# Patient Record
Sex: MALE | Race: WHITE | NOT HISPANIC OR LATINO | Employment: OTHER | URBAN - NONMETROPOLITAN AREA
[De-identification: names, ages, dates, MRNs, and addresses within clinical notes are randomized per-mention and may not be internally consistent; named-entity substitution may affect disease eponyms.]

---

## 2017-01-19 ENCOUNTER — TRANSCRIBE ORDERS (OUTPATIENT)
Dept: ADMINISTRATIVE | Facility: HOSPITAL | Age: 57
End: 2017-01-19

## 2017-01-19 ENCOUNTER — HOSPITAL ENCOUNTER (OUTPATIENT)
Dept: CT IMAGING | Facility: HOSPITAL | Age: 57
Discharge: HOME | End: 2017-01-19
Attending: INTERNAL MEDICINE | Admitting: INTERNAL MEDICINE

## 2017-01-19 ENCOUNTER — APPOINTMENT (OUTPATIENT)
Dept: LAB | Facility: HOSPITAL | Age: 57
End: 2017-01-19
Attending: INTERNAL MEDICINE

## 2017-01-19 DIAGNOSIS — C34.11 SQUAMOUS CELL CARCINOMA OF BRONCHUS IN RIGHT UPPER LOBE (HCC): Primary | ICD-10-CM

## 2017-01-19 DIAGNOSIS — C34.00 LUNG CANCER, MAIN BRONCHUS, UNSPECIFIED LATERALITY (HCC): ICD-10-CM

## 2017-01-19 LAB
ALBUMIN SERPL-MCNC: 4.5 G/DL (ref 3.5–5)
ALBUMIN/GLOB SERPL: 1.5 G/DL (ref 1.1–2.5)
ALP SERPL-CCNC: 84 U/L (ref 24–120)
ALT SERPL W P-5'-P-CCNC: 47 U/L (ref 0–54)
ANION GAP SERPL CALCULATED.3IONS-SCNC: 15 MMOL/L (ref 4–13)
AST SERPL-CCNC: 31 U/L (ref 7–45)
BASOPHILS # BLD AUTO: 0.03 10*3/MM3 (ref 0–0.2)
BASOPHILS NFR BLD AUTO: 0.4 % (ref 0–2)
BILIRUB SERPL-MCNC: 0.6 MG/DL (ref 0.1–1)
BUN BLD-MCNC: 17 MG/DL (ref 5–21)
BUN/CREAT SERPL: 20.2 (ref 7–25)
CALCIUM SPEC-SCNC: 9.4 MG/DL (ref 8.4–10.4)
CEA SERPL-MCNC: 1.55 NG/ML (ref 0–5)
CHLORIDE SERPL-SCNC: 97 MMOL/L (ref 98–110)
CO2 SERPL-SCNC: 24 MMOL/L (ref 24–31)
CREAT BLD-MCNC: 0.84 MG/DL (ref 0.5–1.4)
CREAT BLDA-MCNC: 0.9 MG/DL (ref 0.6–1.3)
DEPRECATED RDW RBC AUTO: 40.5 FL (ref 40–54)
EOSINOPHIL # BLD AUTO: 0.2 10*3/MM3 (ref 0–0.7)
EOSINOPHIL NFR BLD AUTO: 2.7 % (ref 0–4)
ERYTHROCYTE [DISTWIDTH] IN BLOOD BY AUTOMATED COUNT: 13.2 % (ref 12–15)
GFR SERPL CREATININE-BSD FRML MDRD: 94 ML/MIN/1.73
GLOBULIN UR ELPH-MCNC: 3.1 GM/DL
GLUCOSE BLD-MCNC: 93 MG/DL (ref 70–100)
HCT VFR BLD AUTO: 44.7 % (ref 40–52)
HGB BLD-MCNC: 15.2 G/DL (ref 14–18)
IMM GRANULOCYTES # BLD: 0 10*3/MM3 (ref 0–0.03)
IMM GRANULOCYTES NFR BLD: 0 % (ref 0–5)
LYMPHOCYTES # BLD AUTO: 1.16 10*3/MM3 (ref 0.72–4.86)
LYMPHOCYTES NFR BLD AUTO: 15.9 % (ref 15–45)
MCH RBC QN AUTO: 29.6 PG (ref 28–32)
MCHC RBC AUTO-ENTMCNC: 34 G/DL (ref 33–36)
MCV RBC AUTO: 87.1 FL (ref 82–95)
MONOCYTES # BLD AUTO: 0.6 10*3/MM3 (ref 0.19–1.3)
MONOCYTES NFR BLD AUTO: 8.2 % (ref 4–12)
NEUTROPHILS # BLD AUTO: 5.29 10*3/MM3 (ref 1.87–8.4)
NEUTROPHILS NFR BLD AUTO: 72.8 % (ref 39–78)
PLATELET # BLD AUTO: 178 10*3/MM3 (ref 130–400)
PMV BLD AUTO: 10.4 FL (ref 6–12)
POTASSIUM BLD-SCNC: 3.9 MMOL/L (ref 3.5–5.3)
PROT SERPL-MCNC: 7.6 G/DL (ref 6.3–8.7)
RBC # BLD AUTO: 5.13 10*6/MM3 (ref 4.8–5.9)
SODIUM BLD-SCNC: 136 MMOL/L (ref 135–145)
WBC NRBC COR # BLD: 7.28 10*3/MM3 (ref 4.8–10.8)

## 2017-01-19 PROCEDURE — 36415 COLL VENOUS BLD VENIPUNCTURE: CPT | Performed by: INTERNAL MEDICINE

## 2017-01-19 PROCEDURE — 71260 CT THORAX DX C+: CPT

## 2017-01-19 PROCEDURE — 82378 CARCINOEMBRYONIC ANTIGEN: CPT | Performed by: INTERNAL MEDICINE

## 2017-01-19 PROCEDURE — 0 IOPAMIDOL 61 % SOLUTION: Performed by: INTERNAL MEDICINE

## 2017-01-19 PROCEDURE — 82565 ASSAY OF CREATININE: CPT

## 2017-01-19 PROCEDURE — 80053 COMPREHEN METABOLIC PANEL: CPT | Performed by: INTERNAL MEDICINE

## 2017-01-19 PROCEDURE — 85025 COMPLETE CBC W/AUTO DIFF WBC: CPT | Performed by: INTERNAL MEDICINE

## 2017-01-19 RX ADMIN — IOPAMIDOL 100 ML: 612 INJECTION, SOLUTION INTRAVENOUS at 10:30

## 2017-01-26 ENCOUNTER — TRANSCRIBE ORDERS (OUTPATIENT)
Dept: INTERNAL MEDICINE | Facility: CLINIC | Age: 57
End: 2017-01-26

## 2017-01-26 ENCOUNTER — INFUSION (OUTPATIENT)
Dept: ONCOLOGY | Facility: HOSPITAL | Age: 57
End: 2017-01-26

## 2017-01-26 ENCOUNTER — HOSPITAL ENCOUNTER (OUTPATIENT)
Dept: INTERVENTIONAL RADIOLOGY/VASCULAR | Facility: HOSPITAL | Age: 57
Discharge: HOME | End: 2017-01-26
Attending: INTERNAL MEDICINE | Admitting: INTERNAL MEDICINE

## 2017-01-26 VITALS
SYSTOLIC BLOOD PRESSURE: 134 MMHG | TEMPERATURE: 97.3 F | RESPIRATION RATE: 20 BRPM | DIASTOLIC BLOOD PRESSURE: 78 MMHG | OXYGEN SATURATION: 96 % | HEART RATE: 73 BPM

## 2017-01-26 DIAGNOSIS — T82.898A OTHER COMPLICATIONS DUE TO OTHER VASCULAR DEVICE, IMPLANT, AND GRAFT: ICD-10-CM

## 2017-01-26 DIAGNOSIS — Z95.828 PORT CATHETER IN PLACE: Primary | ICD-10-CM

## 2017-01-26 DIAGNOSIS — C34.11 MALIGNANT NEOPLASM OF UPPER LOBE BRONCHUS, RIGHT (HCC): ICD-10-CM

## 2017-01-26 DIAGNOSIS — C34.11 MALIGNANT NEOPLASM OF UPPER LOBE BRONCHUS, RIGHT (HCC): Primary | ICD-10-CM

## 2017-01-26 DIAGNOSIS — C34.11 MALIGNANT NEOPLASM OF UPPER LOBE OF RIGHT LUNG (HCC): Primary | ICD-10-CM

## 2017-01-26 DIAGNOSIS — Z95.828 PORT CATHETER IN PLACE: ICD-10-CM

## 2017-01-26 PROCEDURE — 0 IOPAMIDOL 61 % SOLUTION: Performed by: RADIOLOGY

## 2017-01-26 PROCEDURE — 25010000002 HEPARIN FLUSH (PORCINE) 100 UNIT/ML SOLUTION: Performed by: INTERNAL MEDICINE

## 2017-01-26 PROCEDURE — 36598 INJ W/FLUOR EVAL CV DEVICE: CPT

## 2017-01-26 PROCEDURE — 96523 IRRIG DRUG DELIVERY DEVICE: CPT

## 2017-01-26 RX ORDER — SODIUM CHLORIDE 0.9 % (FLUSH) 0.9 %
10 SYRINGE (ML) INJECTION AS NEEDED
Status: DISCONTINUED | OUTPATIENT
Start: 2017-01-26 | End: 2017-01-27 | Stop reason: HOSPADM

## 2017-01-26 RX ORDER — SODIUM CHLORIDE 0.9 % (FLUSH) 0.9 %
10 SYRINGE (ML) INJECTION AS NEEDED
Start: 2017-01-26

## 2017-01-26 RX ORDER — SODIUM CHLORIDE 0.9 % (FLUSH) 0.9 %
10 SYRINGE (ML) INJECTION AS NEEDED
Status: CANCELLED
Start: 2017-01-26

## 2017-01-26 RX ORDER — SODIUM CHLORIDE 0.9 % (FLUSH) 0.9 %
10 SYRINGE (ML) INJECTION AS NEEDED
Status: DISCONTINUED | OUTPATIENT
Start: 2017-01-26 | End: 2017-01-26 | Stop reason: HOSPADM

## 2017-01-26 RX ADMIN — Medication 10 ML: at 13:04

## 2017-01-26 RX ADMIN — Medication 10 ML: at 08:47

## 2017-01-26 RX ADMIN — IOPAMIDOL 9 ML: 612 INJECTION, SOLUTION INTRAVENOUS at 12:48

## 2017-01-26 RX ADMIN — SODIUM CHLORIDE, PRESERVATIVE FREE 500 UNITS: 5 INJECTION INTRAVENOUS at 08:48

## 2017-01-26 RX ADMIN — SODIUM CHLORIDE, PRESERVATIVE FREE 500 UNITS: 5 INJECTION INTRAVENOUS at 13:05

## 2017-01-26 RX ADMIN — SODIUM CHLORIDE, PRESERVATIVE FREE 500 UNITS: 5 INJECTION INTRAVENOUS at 08:49

## 2017-01-26 NOTE — NURSING NOTE
Pt arrived for port study. States port will flush but not draw blood. Identity confirmed by two identifiers, consent obtained. Assisted onto table. Dual lumen port right clavicular area, lateral port accessed per policy. Flushes easily but will not draw blood. Port study performed, reviewed by MD.

## 2017-01-27 ENCOUNTER — HOSPITAL ENCOUNTER (OUTPATIENT)
Dept: HOSPITAL 58 - LAB | Age: 57
Discharge: HOME | End: 2017-01-27
Attending: FAMILY MEDICINE

## 2017-01-27 VITALS — BODY MASS INDEX: 29 KG/M2

## 2017-01-27 DIAGNOSIS — F32.9: ICD-10-CM

## 2017-01-27 DIAGNOSIS — I10: Primary | ICD-10-CM

## 2017-01-27 DIAGNOSIS — Z85.118: ICD-10-CM

## 2017-01-27 DIAGNOSIS — Z79.899: ICD-10-CM

## 2017-01-27 LAB
ADD URINE MICROSCOPIC: YES
ALBUMIN SERPL-MCNC: 4.5 G/DL (ref 3.4–5)
ALBUMIN/GLOB SERPL: 1.36 {RATIO}
ALP SERPL-CCNC: 74 U/L (ref 50–136)
ALT SERPL-CCNC: 45 U/L (ref 12–78)
ANION GAP SERPL CALC-SCNC: 15.1 MMOL/L
AST SERPL-CCNC: 25 U/L (ref 15–37)
BASOPHILS # BLD AUTO: 0 K/UL (ref 0–0.2)
BASOPHILS NFR BLD AUTO: 0.7 % (ref 0–3)
BILIRUB SERPL-MCNC: 0.53 MG/DL (ref 0–1.2)
BUN SERPL-MCNC: 18 MG/DL (ref 7–18)
BUN/CREAT SERPL: 20.22
CALCIUM SERPL-MCNC: 9.8 MG/DL (ref 8.2–10.2)
CHLORIDE SERPL-SCNC: 105 MMOL/L (ref 98–107)
CO2 BLD-SCNC: 23 MMOL/L (ref 21–32)
CREAT SERPL-MCNC: 0.89 MG/DL (ref 0.6–1.1)
EOSINOPHIL # BLD AUTO: 0.2 K/UL (ref 0–0.7)
EOSINOPHIL NFR BLD AUTO: 3.9 % (ref 0–7)
GFR SERPLBLD BASED ON 1.73 SQ M-ARVRAT: 88 ML/MIN
GLOBULIN SER CALC-MCNC: 3.3 G/L
GLUCOSE SERPL-MCNC: 117 MG/DL (ref 70–100)
HCT VFR BLD AUTO: 44.8 % (ref 42–52)
HGB BLD-MCNC: 15.4 G/DL (ref 14–18)
IMM GRANULOCYTES NFR BLD AUTO: 0.4 % (ref 0–5)
LYMPHOCYTES # SPEC AUTO: 0.9 K/UL (ref 0.6–3.4)
LYMPHOCYTES NFR BLD AUTO: 16.5 % (ref 10–50)
MCH RBC QN: 30.6 PG (ref 27–31)
MCHC RBC AUTO-ENTMCNC: 34.4 G/DL (ref 31.8–35.4)
MCV RBC: 88.9 FL (ref 80–94)
MONOCYTES # BLD AUTO: 0.5 K/UL (ref 0.4–2)
MONOCYTES NFR BLD AUTO: 8.5 % (ref 0–10)
NEUTROPHILS # BLD AUTO: 4 K/UL (ref 2–6.9)
NEUTROPHILS NFR BLD AUTO: 70 %
PH UR: 5.5 [PH] (ref 5–9)
PLATELET # BLD AUTO: 171 10^3/UL (ref 140–440)
POTASSIUM SERPL-SCNC: 4.1 MMOL/L (ref 3.5–5.1)
PROT SERPL-MCNC: 7.8 G/DL (ref 6.4–8.2)
RBC # BLD AUTO: 5.04 10^6/UL (ref 4.7–6.1)
SODIUM SERPL-SCNC: 139 MMOL/L (ref 136–145)
SP GR UR: 1.02 (ref 1–1.03)
WBC # BLD AUTO: 5.68 K/UL (ref 4.2–10.2)

## 2017-01-27 PROCEDURE — 80053 COMPREHEN METABOLIC PANEL: CPT

## 2017-01-27 PROCEDURE — 84443 ASSAY THYROID STIM HORMONE: CPT

## 2017-01-27 PROCEDURE — 85025 COMPLETE CBC W/AUTO DIFF WBC: CPT

## 2017-01-27 PROCEDURE — 84439 ASSAY OF FREE THYROXINE: CPT

## 2017-01-27 PROCEDURE — 81001 URINALYSIS AUTO W/SCOPE: CPT

## 2017-01-27 PROCEDURE — 36415 COLL VENOUS BLD VENIPUNCTURE: CPT

## 2017-01-31 ENCOUNTER — OFFICE VISIT (OUTPATIENT)
Dept: SURGERY | Age: 57
End: 2017-01-31

## 2017-01-31 ENCOUNTER — OFFICE VISIT (OUTPATIENT)
Dept: GASTROENTEROLOGY | Facility: CLINIC | Age: 57
End: 2017-01-31

## 2017-01-31 VITALS
WEIGHT: 181 LBS | BODY MASS INDEX: 27.52 KG/M2 | SYSTOLIC BLOOD PRESSURE: 100 MMHG | HEART RATE: 88 BPM | DIASTOLIC BLOOD PRESSURE: 70 MMHG

## 2017-01-31 VITALS
DIASTOLIC BLOOD PRESSURE: 84 MMHG | OXYGEN SATURATION: 98 % | HEIGHT: 66 IN | HEART RATE: 60 BPM | WEIGHT: 181 LBS | BODY MASS INDEX: 29.09 KG/M2 | SYSTOLIC BLOOD PRESSURE: 122 MMHG

## 2017-01-31 DIAGNOSIS — Z01.818 PRE-OP EVALUATION: ICD-10-CM

## 2017-01-31 DIAGNOSIS — M54.2 NECK PAIN ON RIGHT SIDE: Primary | ICD-10-CM

## 2017-01-31 DIAGNOSIS — I10 HTN (HYPERTENSION), BENIGN: ICD-10-CM

## 2017-01-31 DIAGNOSIS — K62.5 BRBPR (BRIGHT RED BLOOD PER RECTUM): ICD-10-CM

## 2017-01-31 DIAGNOSIS — T14.8XXA SKIN EXCORIATION: ICD-10-CM

## 2017-01-31 DIAGNOSIS — R19.4 CHANGE IN BOWEL HABITS: Primary | ICD-10-CM

## 2017-01-31 PROCEDURE — 99204 OFFICE O/P NEW MOD 45 MIN: CPT | Performed by: CLINICAL NURSE SPECIALIST

## 2017-01-31 PROCEDURE — 99213 OFFICE O/P EST LOW 20 MIN: CPT | Performed by: SURGERY

## 2017-01-31 RX ORDER — FLUOXETINE 10 MG/1
10 CAPSULE ORAL DAILY
COMMUNITY

## 2017-01-31 RX ORDER — LISINOPRIL 5 MG/1
5 TABLET ORAL DAILY
COMMUNITY

## 2017-01-31 RX ORDER — BUDESONIDE AND FORMOTEROL FUMARATE DIHYDRATE 80; 4.5 UG/1; UG/1
2 AEROSOL RESPIRATORY (INHALATION) 2 TIMES DAILY
COMMUNITY

## 2017-01-31 RX ORDER — MELOXICAM 15 MG/1
15 TABLET ORAL DAILY
COMMUNITY

## 2017-01-31 NOTE — PROGRESS NOTES
"Chief Complaint   Patient presents with   • Rectal Bleeding     New patient ref by Dr. Schneider     Subjective   HPI  Familia Michael is a 56 y.o. male who presents with a complaint of rectal bleeding. He has had some change in bowels with associated BRBPR. Bowels are more constipated which began with the beginning of chemo in 2014. He will go 2 days without a BM and they are hard and difficult to evacuate. No triggers. No alleviating factors.  First episode of BRBPR was last week. None the last few days. It was of small to moderate amount on the tissue on the tissue when he wipes. He does have rectal itching and burning. He is unsure if he has hemorrhoids. He is being followed by Dr schneider for Stage III squamous cell carcinoma of the lung. He has completed his chemo and radiation and is in remission. He has never had a colonoscopy. No known family hx of colon cancer.       Past Medical History   Diagnosis Date   • Arthritis    • Asthma    • Cancer      lung   • COPD (chronic obstructive pulmonary disease)    • Hypertension      Past Surgical History   Procedure Laterality Date   • Finger surgery       Reattachment of left ring finger   • Bronchoscopy     • Mediport insertion, single       Outpatient Prescriptions Marked as Taking for the 1/31/17 encounter (Office Visit) with DOLORES Mcmahon   Medication Sig Dispense Refill   • albuterol (PROVENTIL HFA;VENTOLIN HFA) 108 (90 BASE) MCG/ACT inhaler Every 6 (Six) Hours.     • budesonide-formoterol (SYMBICORT) 160-4.5 MCG/ACT inhaler Inhale 2 puffs 2 (Two) Times a Day.     • FLUoxetine (PROzac) 10 MG capsule Take 10 mg by mouth Daily.     • lisinopril (PRINIVIL,ZESTRIL) 10 MG tablet Take 10 mg by mouth daily.       Allergies   Allergen Reactions   • Tylenol With Codeine #3  [Acetaminophen-Codeine] Hives, Itching and Rash   • Naproxen      \"hurts my stomach\"   • Sulfa Antibiotics Rash     Social History     Social History   • Marital status: Single     " Spouse name: N/A   • Number of children: N/A   • Years of education: N/A     Occupational History   • Not on file.     Social History Main Topics   • Smoking status: Former Smoker     Packs/day: 2.00     Years: 20.00     Types: Cigarettes   • Smokeless tobacco: Never Used   • Alcohol use No   • Drug use: No   • Sexual activity: Defer     Other Topics Concern   • Not on file     Social History Narrative     Family History   Problem Relation Age of Onset   • Colon cancer Neg Hx    • Colon polyps Neg Hx      Health Maintenance   Topic Date Due   • HEPATITIS C SCREENING  1960   • TDAP/TD VACCINES (1 - Tdap) 12/22/1978   • INFLUENZA VACCINE  08/01/2016     Review of Systems   Constitutional: Negative for activity change, appetite change, chills, diaphoresis, fatigue, fever and unexpected weight change.   HENT: Negative for ear pain, hearing loss, mouth sores, sore throat, trouble swallowing and voice change.    Eyes: Negative.    Respiratory: Negative for cough, choking, shortness of breath and wheezing.    Cardiovascular: Negative for chest pain and palpitations.   Gastrointestinal: Positive for anal bleeding and constipation. Negative for abdominal pain, blood in stool, diarrhea, nausea and vomiting.   Endocrine: Negative for cold intolerance and heat intolerance.   Genitourinary: Negative for decreased urine volume, dysuria, frequency, hematuria and urgency.   Musculoskeletal: Negative for back pain, gait problem and myalgias.   Skin: Negative for color change, pallor and rash.   Allergic/Immunologic: Negative for food allergies and immunocompromised state.   Neurological: Negative for dizziness, tremors, seizures, syncope, weakness, light-headedness, numbness and headaches.   Hematological: Negative for adenopathy. Does not bruise/bleed easily.   Psychiatric/Behavioral: Negative for agitation and confusion. The patient is not nervous/anxious.    All other systems reviewed and are negative.    Objective  "  Vitals:    01/31/17 1002   BP: 122/84   Pulse: 60   SpO2: 98%   Weight: 181 lb (82.1 kg)   Height: 66\" (167.6 cm)     Body mass index is 29.21 kg/(m^2).  Physical Exam   Constitutional: He is oriented to person, place, and time. He appears well-developed and well-nourished.   HENT:   Head: Normocephalic and atraumatic.   Eyes: Pupils are equal, round, and reactive to light.   Neck: Normal range of motion. Neck supple. No tracheal deviation present.   Cardiovascular: Normal rate, regular rhythm and normal heart sounds.  Exam reveals no gallop and no friction rub.    No murmur heard.  Pulmonary/Chest: Effort normal and breath sounds normal. No respiratory distress. He has no wheezes. He has no rales. He exhibits no tenderness.   Abdominal: Soft. Bowel sounds are normal. He exhibits no distension. There is no hepatosplenomegaly. There is no tenderness. There is no rigidity, no rebound and no guarding.   Genitourinary:   Genitourinary Comments: Small hemorrhoid no mass appreciated.    Musculoskeletal: Normal range of motion. He exhibits no edema, tenderness or deformity.   Neurological: He is alert and oriented to person, place, and time. He has normal reflexes.   Skin: Skin is warm and dry. No rash noted. No pallor.   From the top of his intergluteal cleft to his rectum he has skin excoriation and irritation. No ulcer or decubitus noted. No mass or abscess on rectal exam.    Psychiatric: He has a normal mood and affect. His behavior is normal. Judgment and thought content normal.     Assessment/Plan   Familia was seen today for rectal bleeding.    Diagnoses and all orders for this visit:    Change in bowel habits  -     polyethylene glycol (GoLYTELY) 236 G solution; Starting at noon on day prior to procedure, drink 8 ounces every 30 minutes until all gone or stools are clear. May add flavor packet.  -     Case Request; Standing  -     Implement Anesthesia Orders Day of Procedure; Standing  -     Obtain Informed " Consent; Standing  -     Verify Informed Consent; Standing  -     Verify bowel prep was successful; Standing  -     Case Request    BRBPR (bright red blood per rectum)    HTN (hypertension), benign  Comments:  contine BP medication the am of procedure.     Skin excoriation  Comments:  intergluteal cleft to the rectum with redness and skin breakdown    Education provided. He is to start a fiber regimen with increased water, I suggested Miralax up to 17 grams twice per day. I  Made dietary modification suggestions as well. I also suggested that he use A&D as a skin barrier and to avoid toilet paper use sensitive wipes only. Keep skin as dry as possible. Open to air when he can.   COLONOSCOPY WITH ANESTHESIA (N/A)  EMR Dragon/transcription disclaimer: Much of this encounter note is electronic transcription/translation of spoken language to printed text. The electronic translation of spoken language may be erroneous, or at times, nonsensical words or phrases may be inadvertently transcribed. Although I have reviewed the note for such errors, some may still exist.  Body mass index is 29.21 kg/(m^2).  Return in about 2 weeks (around 2/14/2017).

## 2017-01-31 NOTE — MR AVS SNAPSHOT
Familia Michael   1/31/2017 10:30 AM   Office Visit    Dept Phone:  [phone]   Encounter #:  [id number]    Provider:  DOLORES Mcmahon   Department:  [facility] GASTROENTEROLOGY                Your Full Care Plan              Today's Medication Changes          These changes are accurate as of: 1/31/17 11:02 AM.  If you have any questions, ask your nurse or doctor.               New Medication(s)Ordered:     polyethylene glycol 236 G solution   Commonly known as:  GoLYTELY   Starting at noon on day prior to procedure, drink 8 ounces every 30 minutes until all gone or stools are clear. May add flavor packet.   Started by:  DOLORES Mcmahon            Where to Get Your Medications      These medications were sent to [facility] - [place], [place] - [address] [place] [address] - [phone]  - [phone] FX  [address] [place] [id number]     Phone:  [phone]     polyethylene glycol 236 G solution                  Your Updated Medication List          This list is accurate as of: 1/31/17 11:02 AM.  Always use your most recent med list.                albuterol 108 (90 BASE) MCG/ACT inhaler   Commonly known as:  PROVENTIL HFA;VENTOLIN HFA       budesonide-formoterol 160-4.5 MCG/ACT inhaler   Commonly known as:  SYMBICORT       FLUoxetine 10 MG capsule   Commonly known as:  PROzac       lisinopril 10 MG tablet   Commonly known as:  PRINIVIL,ZESTRIL       omeprazole 20 MG capsule   Commonly known as:  priLOSEC       polyethylene glycol 236 G solution   Commonly known as:  GoLYTELY   Starting at noon on day prior to procedure, drink 8 ounces every 30 minutes until all gone or stools are clear. May add flavor packet.       sucralfate 1 G tablet   Commonly known as:  CARAFATE               We Performed the Following     Case Request       You Were Diagnosed With        Codes Comments    Change in bowel habits    -  Primary ICD-10-CM: R19.4  ICD-9-CM:  "787.99     BRBPR (bright red blood per rectum)     ICD-10-CM: K62.5  ICD-9-CM: 569.3     HTN (hypertension), benign     ICD-10-CM: I10  ICD-9-CM: 401.1 contine BP medication the am of procedure.       Instructions     None    Patient Instructions History      Upcoming Appointments     Visit Type Date Time Department    NEW PATIENT 1/31/2017 10:30 AM [facility] GASTRO PAD    OFFICE VISIT 2/15/2017  8:00 AM [facility] GASTRO PAD    PORT FLUSH 3/9/2017  9:00 AM [facility] OP INFU ONC    FOLLOW UP - ONCOLOGY 6/29/2017  1:30 PM NEW RAD ONC PAD    CT PAD CHEST W CONTRAST 7/6/2017  9:30 AM [facility] PAD CT BIC      MyChart Signup     Our records indicate that you have declined [facility] signup. If you would like to sign up for [facility], please email [email or web address] or call [phone] to obtain an activation code.             Other Info from Your Visit           Your Appointments     Feb 15, 2017  8:00 AM CST   Office Visit with DOLORES Mcmahon   [facility] (--)    [address] [place] [address] [address]  [facility] [id number] [phone]           Arrive 15 minutes prior to appointment.            Mar 09, 2017  9:00 AM CST   PORT FLUSH with -3 [facility] PAD OP INFUS   [facility] OP INFU ONC– ONCOLOGY ([place])    [address] [place] [id number] [phone]            Jun 29, 2017  1:30 PM CDT   FOLLOW UP with Moses Bennett III, MD   NEW RAD ONC PAD (--)    [address] [place] [id number] [phone]            Jul 06, 2017  9:30 AM CDT   CT pad chest w contrast with PAD BIC CT 1   [facility] CT BIC ([place])    [address] [place] [id number] [phone]           BRING CURRENT LIST MEDS              Allergies     Tylenol With Codeine #3  [Acetaminophen-codeine]  Hives, Itching, Rash    Naproxen      \"hurts my stomach\"    Sulfa Antibiotics  Rash      Reason for Visit     Rectal Bleeding New patient ref by Dr. Schneider    " "  Vital Signs     Blood Pressure Pulse Height Weight Oxygen Saturation Body Mass Index    122/84 60 66\" (167.6 cm) 181 lb (82.1 kg) 98% 29.21 kg/m2    Smoking Status                   Former Smoker           Problems and Diagnoses Noted     BRBPR (bright red blood per rectum)    Change in bowel habits    HTN (hypertension), benign        "

## 2017-02-01 ENCOUNTER — TELEPHONE (OUTPATIENT)
Dept: SURGERY | Age: 57
End: 2017-02-01

## 2017-02-01 ENCOUNTER — PREP FOR PROCEDURE (OUTPATIENT)
Dept: SURGERY | Age: 57
End: 2017-02-01

## 2017-02-01 DIAGNOSIS — M54.2 NECK PAIN ON RIGHT SIDE: Primary | ICD-10-CM

## 2017-02-02 ENCOUNTER — HOSPITAL ENCOUNTER (OUTPATIENT)
Dept: HOSPITAL 58 - RAD | Age: 57
Discharge: HOME | End: 2017-02-02
Attending: SURGERY

## 2017-02-02 ENCOUNTER — TELEPHONE (OUTPATIENT)
Dept: SURGERY | Age: 57
End: 2017-02-02

## 2017-02-02 VITALS — BODY MASS INDEX: 29 KG/M2

## 2017-02-02 DIAGNOSIS — M54.2: Primary | ICD-10-CM

## 2017-02-02 NOTE — US
EXAM:  Ultrasound right upper extremity venous Doppler. 

  

HISTORY:  Right neck pain. 

  

COMPARISON:  None available. 

  

TECHNIQUE:  Multiple gray scale and color Doppler images. 

  

FINDINGS:  There is normal color flow, compressibility, and augmentation of flow within the right ju
gular, subclavian, axillary, brachial, basilic, cephalic, radial, and ulnar veins.  There is some ec
hogenic material surrounding the subcutaneous portion of the chest port. 

  

------------------------------ 

IMPRESSION: 

1.  No evidence for right upper extremity deep vein thrombosis at the levels examined. 

2.  Possible clot surrounding the proximal chest port.  Consider fluoroscopic evaluation.

## 2017-02-03 ENCOUNTER — HOSPITAL ENCOUNTER (OUTPATIENT)
Dept: PREADMISSION TESTING | Age: 57
Discharge: HOME | End: 2017-02-03
Payer: MEDICAID

## 2017-02-03 VITALS — WEIGHT: 180 LBS | BODY MASS INDEX: 28.93 KG/M2 | HEIGHT: 66 IN

## 2017-02-03 DIAGNOSIS — Z01.818 PRE-OP EVALUATION: ICD-10-CM

## 2017-02-06 ENCOUNTER — HOSPITAL ENCOUNTER (OUTPATIENT)
Age: 57
Setting detail: OUTPATIENT SURGERY
Discharge: HOME | End: 2017-02-06
Attending: SURGERY | Admitting: SURGERY
Payer: MEDICAID

## 2017-02-06 ENCOUNTER — SURGERY (OUTPATIENT)
Age: 57
End: 2017-02-06

## 2017-02-06 ENCOUNTER — ANESTHESIA EVENT (OUTPATIENT)
Dept: OPERATING ROOM | Age: 57
End: 2017-02-06
Payer: MEDICAID

## 2017-02-06 ENCOUNTER — ANESTHESIA (OUTPATIENT)
Dept: OPERATING ROOM | Age: 57
End: 2017-02-06
Payer: MEDICAID

## 2017-02-06 VITALS — SYSTOLIC BLOOD PRESSURE: 98 MMHG | OXYGEN SATURATION: 99 % | DIASTOLIC BLOOD PRESSURE: 64 MMHG

## 2017-02-06 VITALS
OXYGEN SATURATION: 96 % | SYSTOLIC BLOOD PRESSURE: 150 MMHG | TEMPERATURE: 97.5 F | BODY MASS INDEX: 28.93 KG/M2 | DIASTOLIC BLOOD PRESSURE: 96 MMHG | WEIGHT: 180 LBS | RESPIRATION RATE: 18 BRPM | HEART RATE: 65 BPM | HEIGHT: 66 IN

## 2017-02-06 PROCEDURE — 7100000000 HC PACU RECOVERY - FIRST 15 MIN: Performed by: SURGERY

## 2017-02-06 PROCEDURE — 7100000010 HC PHASE II RECOVERY - FIRST 15 MIN: Performed by: SURGERY

## 2017-02-06 PROCEDURE — 3600000012 HC SURGERY LEVEL 2 ADDTL 15MIN: Performed by: SURGERY

## 2017-02-06 PROCEDURE — 2580000003 HC RX 258: Performed by: SURGERY

## 2017-02-06 PROCEDURE — 2720000001 HC MISC SURG SUPPLY STERILE $51-500: Performed by: SURGERY

## 2017-02-06 PROCEDURE — 7100000011 HC PHASE II RECOVERY - ADDTL 15 MIN: Performed by: SURGERY

## 2017-02-06 PROCEDURE — 2500000003 HC RX 250 WO HCPCS: Performed by: SURGERY

## 2017-02-06 PROCEDURE — 3700000001 HC ADD 15 MINUTES (ANESTHESIA): Performed by: SURGERY

## 2017-02-06 PROCEDURE — 6360000002 HC RX W HCPCS: Performed by: NURSE ANESTHETIST, CERTIFIED REGISTERED

## 2017-02-06 PROCEDURE — 3600000002 HC SURGERY LEVEL 2 BASE: Performed by: SURGERY

## 2017-02-06 PROCEDURE — 3700000000 HC ANESTHESIA ATTENDED CARE: Performed by: SURGERY

## 2017-02-06 PROCEDURE — 2720000003 HC MISC SUTURE/STAPLES/RELOADS/ETC: Performed by: SURGERY

## 2017-02-06 PROCEDURE — 36590 REMOVAL TUNNELED CV CATH: CPT | Performed by: SURGERY

## 2017-02-06 RX ORDER — METOCLOPRAMIDE HYDROCHLORIDE 5 MG/ML
10 INJECTION INTRAMUSCULAR; INTRAVENOUS
Status: DISCONTINUED | OUTPATIENT
Start: 2017-02-06 | End: 2017-02-06 | Stop reason: HOSPADM

## 2017-02-06 RX ORDER — FENTANYL CITRATE 50 UG/ML
INJECTION, SOLUTION INTRAMUSCULAR; INTRAVENOUS PRN
Status: DISCONTINUED | OUTPATIENT
Start: 2017-02-06 | End: 2017-02-06 | Stop reason: SDUPTHER

## 2017-02-06 RX ORDER — MORPHINE SULFATE 4 MG/ML
4 INJECTION, SOLUTION INTRAMUSCULAR; INTRAVENOUS EVERY 5 MIN PRN
Status: DISCONTINUED | OUTPATIENT
Start: 2017-02-06 | End: 2017-02-06 | Stop reason: HOSPADM

## 2017-02-06 RX ORDER — DIPHENHYDRAMINE HYDROCHLORIDE 50 MG/ML
12.5 INJECTION INTRAMUSCULAR; INTRAVENOUS
Status: DISCONTINUED | OUTPATIENT
Start: 2017-02-06 | End: 2017-02-06 | Stop reason: HOSPADM

## 2017-02-06 RX ORDER — MORPHINE SULFATE 4 MG/ML
2 INJECTION, SOLUTION INTRAMUSCULAR; INTRAVENOUS EVERY 5 MIN PRN
Status: DISCONTINUED | OUTPATIENT
Start: 2017-02-06 | End: 2017-02-06 | Stop reason: HOSPADM

## 2017-02-06 RX ORDER — PROPOFOL 10 MG/ML
INJECTION, EMULSION INTRAVENOUS PRN
Status: DISCONTINUED | OUTPATIENT
Start: 2017-02-06 | End: 2017-02-06 | Stop reason: SDUPTHER

## 2017-02-06 RX ORDER — HYDROCODONE BITARTRATE AND ACETAMINOPHEN 5; 325 MG/1; MG/1
TABLET ORAL
Qty: 10 TABLET | Refills: 0 | Status: SHIPPED | OUTPATIENT
Start: 2017-02-06 | End: 2017-02-22 | Stop reason: ALTCHOICE

## 2017-02-06 RX ORDER — ONDANSETRON 2 MG/ML
INJECTION INTRAMUSCULAR; INTRAVENOUS PRN
Status: DISCONTINUED | OUTPATIENT
Start: 2017-02-06 | End: 2017-02-06 | Stop reason: SDUPTHER

## 2017-02-06 RX ORDER — HYDRALAZINE HYDROCHLORIDE 20 MG/ML
5 INJECTION INTRAMUSCULAR; INTRAVENOUS EVERY 10 MIN PRN
Status: DISCONTINUED | OUTPATIENT
Start: 2017-02-06 | End: 2017-02-06 | Stop reason: HOSPADM

## 2017-02-06 RX ORDER — CEFAZOLIN SODIUM 1 G/3ML
INJECTION, POWDER, FOR SOLUTION INTRAMUSCULAR; INTRAVENOUS PRN
Status: DISCONTINUED | OUTPATIENT
Start: 2017-02-06 | End: 2017-02-06 | Stop reason: SDUPTHER

## 2017-02-06 RX ORDER — MIDAZOLAM HYDROCHLORIDE 1 MG/ML
INJECTION INTRAMUSCULAR; INTRAVENOUS PRN
Status: DISCONTINUED | OUTPATIENT
Start: 2017-02-06 | End: 2017-02-06 | Stop reason: SDUPTHER

## 2017-02-06 RX ORDER — MEPERIDINE HYDROCHLORIDE 25 MG/ML
12.5 INJECTION INTRAMUSCULAR; INTRAVENOUS; SUBCUTANEOUS EVERY 5 MIN PRN
Status: DISCONTINUED | OUTPATIENT
Start: 2017-02-06 | End: 2017-02-06 | Stop reason: HOSPADM

## 2017-02-06 RX ORDER — HYDROCODONE BITARTRATE AND ACETAMINOPHEN 5; 325 MG/1; MG/1
1 TABLET ORAL EVERY 6 HOURS PRN
Status: DISCONTINUED | OUTPATIENT
Start: 2017-02-06 | End: 2017-02-06 | Stop reason: HOSPADM

## 2017-02-06 RX ORDER — LABETALOL HYDROCHLORIDE 5 MG/ML
5 INJECTION, SOLUTION INTRAVENOUS EVERY 10 MIN PRN
Status: DISCONTINUED | OUTPATIENT
Start: 2017-02-06 | End: 2017-02-06 | Stop reason: HOSPADM

## 2017-02-06 RX ORDER — HYDROCODONE BITARTRATE AND ACETAMINOPHEN 5; 325 MG/1; MG/1
2 TABLET ORAL EVERY 6 HOURS PRN
Status: DISCONTINUED | OUTPATIENT
Start: 2017-02-06 | End: 2017-02-06 | Stop reason: HOSPADM

## 2017-02-06 RX ORDER — SODIUM CHLORIDE, SODIUM LACTATE, POTASSIUM CHLORIDE, CALCIUM CHLORIDE 600; 310; 30; 20 MG/100ML; MG/100ML; MG/100ML; MG/100ML
INJECTION, SOLUTION INTRAVENOUS CONTINUOUS
Status: DISCONTINUED | OUTPATIENT
Start: 2017-02-06 | End: 2017-02-06 | Stop reason: HOSPADM

## 2017-02-06 RX ORDER — ENALAPRILAT 2.5 MG/2ML
1.25 INJECTION INTRAVENOUS
Status: DISCONTINUED | OUTPATIENT
Start: 2017-02-06 | End: 2017-02-06 | Stop reason: HOSPADM

## 2017-02-06 RX ORDER — LIDOCAINE HYDROCHLORIDE 10 MG/ML
1 INJECTION, SOLUTION EPIDURAL; INFILTRATION; INTRACAUDAL; PERINEURAL ONCE
Status: COMPLETED | OUTPATIENT
Start: 2017-02-06 | End: 2017-02-06

## 2017-02-06 RX ORDER — PROMETHAZINE HYDROCHLORIDE 25 MG/ML
6.25 INJECTION, SOLUTION INTRAMUSCULAR; INTRAVENOUS
Status: DISCONTINUED | OUTPATIENT
Start: 2017-02-06 | End: 2017-02-06 | Stop reason: HOSPADM

## 2017-02-06 RX ADMIN — MIDAZOLAM HYDROCHLORIDE 2 MG: 1 INJECTION, SOLUTION INTRAMUSCULAR; INTRAVENOUS at 08:23

## 2017-02-06 RX ADMIN — PROPOFOL 30 MG: 10 INJECTION, EMULSION INTRAVENOUS at 08:37

## 2017-02-06 RX ADMIN — FENTANYL CITRATE 50 MCG: 50 INJECTION INTRAMUSCULAR; INTRAVENOUS at 08:28

## 2017-02-06 RX ADMIN — ONDANSETRON HYDROCHLORIDE 4 MG: 2 INJECTION, SOLUTION INTRAVENOUS at 08:35

## 2017-02-06 RX ADMIN — LIDOCAINE HYDROCHLORIDE 70 ML: 10 INJECTION, SOLUTION EPIDURAL; INFILTRATION; INTRACAUDAL; PERINEURAL at 08:48

## 2017-02-06 RX ADMIN — PROPOFOL 30 MG: 10 INJECTION, EMULSION INTRAVENOUS at 08:33

## 2017-02-06 RX ADMIN — FENTANYL CITRATE 50 MCG: 50 INJECTION INTRAMUSCULAR; INTRAVENOUS at 08:26

## 2017-02-06 RX ADMIN — FENTANYL CITRATE 50 MCG: 50 INJECTION INTRAMUSCULAR; INTRAVENOUS at 08:37

## 2017-02-06 RX ADMIN — PROPOFOL 30 MG: 10 INJECTION, EMULSION INTRAVENOUS at 08:28

## 2017-02-06 RX ADMIN — SODIUM CHLORIDE, POTASSIUM CHLORIDE, SODIUM LACTATE AND CALCIUM CHLORIDE: 600; 310; 30; 20 INJECTION, SOLUTION INTRAVENOUS at 07:15

## 2017-02-06 RX ADMIN — CEFAZOLIN 2000 MG: 1 INJECTION, POWDER, FOR SOLUTION INTRAVENOUS at 08:30

## 2017-02-06 RX ADMIN — LIDOCAINE HYDROCHLORIDE 1 ML: 10 INJECTION, SOLUTION EPIDURAL; INFILTRATION; INTRACAUDAL; PERINEURAL at 07:15

## 2017-02-06 ASSESSMENT — PAIN SCALES - GENERAL
PAINLEVEL_OUTOF10: 0
PAINLEVEL_OUTOF10: 0

## 2017-02-06 ASSESSMENT — PAIN - FUNCTIONAL ASSESSMENT: PAIN_FUNCTIONAL_ASSESSMENT: 0-10

## 2017-02-06 ASSESSMENT — PAIN DESCRIPTION - LOCATION: LOCATION: CHEST

## 2017-02-06 ASSESSMENT — PAIN DESCRIPTION - PAIN TYPE: TYPE: SURGICAL PAIN

## 2017-02-07 LAB
EKG P AXIS: 71 DEGREES
EKG P-R INTERVAL: 188 MS
EKG Q-T INTERVAL: 386 MS
EKG QRS DURATION: 98 MS
EKG QTC CALCULATION (BAZETT): 385 MS
EKG T AXIS: 51 DEGREES

## 2017-02-15 ENCOUNTER — OFFICE VISIT (OUTPATIENT)
Dept: GASTROENTEROLOGY | Facility: CLINIC | Age: 57
End: 2017-02-15

## 2017-02-15 ENCOUNTER — TELEPHONE (OUTPATIENT)
Dept: GASTROENTEROLOGY | Facility: CLINIC | Age: 57
End: 2017-02-15

## 2017-02-15 VITALS
HEART RATE: 88 BPM | DIASTOLIC BLOOD PRESSURE: 80 MMHG | SYSTOLIC BLOOD PRESSURE: 120 MMHG | BODY MASS INDEX: 29.09 KG/M2 | RESPIRATION RATE: 18 BRPM | HEIGHT: 66 IN | WEIGHT: 181 LBS

## 2017-02-15 DIAGNOSIS — R23.8 SKIN BREAKDOWN: Primary | ICD-10-CM

## 2017-02-15 DIAGNOSIS — Z78.9 NONSMOKER: ICD-10-CM

## 2017-02-15 PROCEDURE — 99213 OFFICE O/P EST LOW 20 MIN: CPT | Performed by: CLINICAL NURSE SPECIALIST

## 2017-02-15 NOTE — TELEPHONE ENCOUNTER
Spoke to Dasha at [facility] they have diaper dope which has nystatin, zinc oxide, and hydrocortisone in it.  30gm is $30, 90gm is $65.  They can run his insurance and see if it will cover it.      Spoke to patients sister, she has some tubes of nystatin, silver sulfadiazine, and  bacitracin zinc that she can mix together and use if that is ok.    [phone] (sister)    [phone] (pharmacy)

## 2017-02-15 NOTE — PROGRESS NOTES
Chief Complaint   Patient presents with   • GI Problem     Here to discuss rectal irritation patient states not much better          HPI    Familia Michael is a  56 y.o. male here for a follow up visit for excoriated skin of the intergluteal cleft to the rectum with redness and skin breakdown. He has tried A&D and antibiotic ointment mixed to treat and it continues to be a problem for him. He has a colonoscopy scheduled for BRBPR and change in bowels as well.    I have put him on a fiber regimen and this is helping. His bleeding is some improved as well. No fever chills or sweats. No wt loss. He is being treated currently for squamous cell carcinoma of the lung and he has completed his chemo/radiation.   Past Medical History   Diagnosis Date   • Arthritis    • Asthma    • Cancer      lung   • COPD (chronic obstructive pulmonary disease)    • Hypertension      Past Surgical History   Procedure Laterality Date   • Finger surgery       Reattachment of left ring finger   • Bronchoscopy     • Mediport insertion, single         Outpatient Prescriptions Marked as Taking for the 2/15/17 encounter (Office Visit) with DOLORES Mcmahon   Medication Sig Dispense Refill   • albuterol (PROVENTIL HFA;VENTOLIN HFA) 108 (90 BASE) MCG/ACT inhaler Every 6 (Six) Hours.     • budesonide-formoterol (SYMBICORT) 160-4.5 MCG/ACT inhaler Inhale 2 puffs 2 (Two) Times a Day.     • FLUoxetine (PROzac) 10 MG capsule Take 10 mg by mouth Daily.     • lisinopril (PRINIVIL,ZESTRIL) 10 MG tablet Take 10 mg by mouth daily.     • omeprazole (priLOSEC) 20 MG capsule Take 40 mg by mouth daily.     • polyethylene glycol (GoLYTELY) 236 G solution Starting at noon on day prior to procedure, drink 8 ounces every 30 minutes until all gone or stools are clear. May add flavor packet. 4000 mL 0   • sucralfate (CARAFATE) 1 G tablet Take 1 g by mouth 3 (Three) Times a Day.         Allergies   Allergen Reactions   • Tylenol With Codeine #3   "[Acetaminophen-Codeine] Hives, Itching and Rash   • Naproxen      \"hurts my stomach\"   • Sulfa Antibiotics Rash       Social History     Social History   • Marital status: Single     Spouse name: N/A   • Number of children: N/A   • Years of education: N/A     Occupational History   • Not on file.     Social History Main Topics   • Smoking status: Former Smoker     Packs/day: 2.00     Years: 20.00     Types: Cigarettes   • Smokeless tobacco: Never Used   • Alcohol use No   • Drug use: No   • Sexual activity: Defer     Other Topics Concern   • Not on file     Social History Narrative       Family History   Problem Relation Age of Onset   • Colon cancer Neg Hx    • Colon polyps Neg Hx        Review of Systems   Constitutional: Negative for chills, diaphoresis and fever.   HENT: Negative for congestion.    Respiratory: Negative for cough, choking, chest tightness and shortness of breath.    Cardiovascular: Negative for chest pain and palpitations.   Gastrointestinal: Positive for blood in stool. Negative for abdominal pain, nausea and vomiting.   Endocrine: Negative for cold intolerance and heat intolerance.   Musculoskeletal: Negative for back pain and joint swelling.   Skin: Negative for color change and rash.        Skin is persistently excoriated to the rectum intergluteal cleft    Neurological: Negative for headaches.   Hematological: Negative for adenopathy. Does not bruise/bleed easily.   Psychiatric/Behavioral: Negative for agitation and confusion.       Visit Vitals   • /80   • Pulse 88   • Resp 18   • Ht 66\" (167.6 cm)   • Wt 181 lb (82.1 kg)   • BMI 29.21 kg/m2       Physical Exam   Constitutional: He is oriented to person, place, and time. He appears well-developed and well-nourished.   HENT:   Head: Normocephalic and atraumatic.   Eyes: Pupils are equal, round, and reactive to light.   Neck: Normal range of motion. Neck supple. No tracheal deviation present.   Cardiovascular: Normal rate, regular " rhythm and normal heart sounds.  Exam reveals no gallop and no friction rub.    No murmur heard.  Pulmonary/Chest: Effort normal and breath sounds normal. No respiratory distress. He has no wheezes. He has no rales. He exhibits no tenderness.   Abdominal: Soft. Bowel sounds are normal. He exhibits no distension. There is no hepatosplenomegaly. There is no tenderness. There is no rigidity, no rebound and no guarding.   Genitourinary:   Genitourinary Comments: Intergluteal cleft skin breakdown noted redness with a yeast appearance.    Musculoskeletal: Normal range of motion. He exhibits no edema, tenderness or deformity.   Neurological: He is alert and oriented to person, place, and time. He has normal reflexes.   Skin: Skin is warm and dry. No rash noted. No pallor.   Excoriated inner buttocks   Psychiatric: He has a normal mood and affect. His behavior is normal. Judgment and thought content normal.       ASSESSMENT AND PLAN    Familia was seen today for gi problem.    Diagnoses and all orders for this visit:    Skin breakdown    Nonsmoker    I have called a compound pharmacy to see about Nystatin, cortisone, and Zinc oxide ointment and the cost was too much for him therefore his sister is going to use the nystatin he already has and mix with zinc oxide to see if we can make this better. The cream at  pharmacy was going to be in excess of 60$ for 1 tube. I have suggested a quarter size amount up to three times per day until better. Dr dumont will see soon for colonoscopy as well. If this is not improved he is to notify me and we will refer him to a dermatologist.   Return in about 3 weeks (around 3/8/2017).

## 2017-02-16 PROBLEM — R23.8 SKIN BREAKDOWN: Status: ACTIVE | Noted: 2017-02-16

## 2017-02-16 PROBLEM — Z78.9 NONSMOKER: Status: ACTIVE | Noted: 2017-02-16

## 2017-02-16 NOTE — TELEPHONE ENCOUNTER
She can mix the nystatin with zinc oxide that would be fine. It doesn't have the steroid in it. The nystatin may be the ingredient that works thanks brook

## 2017-02-18 ENCOUNTER — HOSPITAL ENCOUNTER (OUTPATIENT)
Dept: HOSPITAL 58 - LAB | Age: 57
Discharge: HOME | End: 2017-02-18
Attending: FAMILY MEDICINE

## 2017-02-18 VITALS — BODY MASS INDEX: 29 KG/M2

## 2017-02-18 DIAGNOSIS — Z09: Primary | ICD-10-CM

## 2017-02-18 DIAGNOSIS — N39.0: ICD-10-CM

## 2017-02-18 LAB
ADD URINE MICROSCOPIC: NO
PH UR: 7.5 [PH] (ref 5–9)
SP GR UR: 1.02 (ref 1–1.03)

## 2017-02-18 PROCEDURE — 81001 URINALYSIS AUTO W/SCOPE: CPT

## 2017-02-20 ENCOUNTER — OUTSIDE FACILITY SERVICE (OUTPATIENT)
Dept: GASTROENTEROLOGY | Facility: CLINIC | Age: 57
End: 2017-02-20

## 2017-02-20 ENCOUNTER — HOSPITAL ENCOUNTER (OUTPATIENT)
Dept: HOSPITAL 58 - SURG | Age: 57
Discharge: HOME | End: 2017-02-20
Attending: INTERNAL MEDICINE

## 2017-02-20 VITALS — DIASTOLIC BLOOD PRESSURE: 66 MMHG | SYSTOLIC BLOOD PRESSURE: 126 MMHG

## 2017-02-20 VITALS — TEMPERATURE: 97.9 F

## 2017-02-20 VITALS — BODY MASS INDEX: 29 KG/M2

## 2017-02-20 DIAGNOSIS — Z12.11: Primary | ICD-10-CM

## 2017-02-20 PROCEDURE — 00810: CPT

## 2017-02-20 PROCEDURE — 45378 DIAGNOSTIC COLONOSCOPY: CPT | Performed by: INTERNAL MEDICINE

## 2017-02-21 NOTE — OP
PROCEDURE:  COLONOSCOPY TO THE CECUM.



ENDOSCOPIST:  SABAS FUNG M.D. 



INDICATION:  SCREENING.



INSTRUMENT:  [facility].



MEDICATION:  PER ANESTHESIA.



PROCEDURE:  The patient was positioned for colonoscopy.  The digital rectal 
exam was negative.  The colonoscope was inserted through the anus and advanced 
to the cecum. 



The cecum was identified using the ileocecal valve and the appendiceal orifice 
as landmarks.  The scope was slowly withdrawn through an adequately prepped 
colon. 



Careful inspection is made of each colonic segment as the scope is withdrawn in 
a circumferential fashion. Care was taken to inspect the proximal side of the 
ileocecal valve, haustral folds, flexures and rectal valves. 



The exam was normal throughout. Retroflex exam was normal. Withdrawal time 6 
minutes and 7 seconds.  



PLAN:  

1.   Suggest repeat colonoscopy for screening in 10 years, sooner if symptoms 
warrant. 



CC:  DR. ALF MCKEON

## 2017-02-22 ENCOUNTER — OFFICE VISIT (OUTPATIENT)
Dept: SURGERY | Age: 57
End: 2017-02-22

## 2017-02-22 VITALS — DIASTOLIC BLOOD PRESSURE: 78 MMHG | HEART RATE: 68 BPM | SYSTOLIC BLOOD PRESSURE: 136 MMHG

## 2017-02-22 DIAGNOSIS — M54.2 NECK PAIN ON RIGHT SIDE: ICD-10-CM

## 2017-02-22 DIAGNOSIS — M54.2 NECK PAIN ON RIGHT SIDE: Primary | ICD-10-CM

## 2017-02-22 PROCEDURE — 99024 POSTOP FOLLOW-UP VISIT: CPT | Performed by: PHYSICIAN ASSISTANT

## 2017-02-26 ENCOUNTER — TELEPHONE (OUTPATIENT)
Dept: GASTROENTEROLOGY | Facility: CLINIC | Age: 57
End: 2017-02-26

## 2017-03-08 ENCOUNTER — OFFICE VISIT (OUTPATIENT)
Dept: GASTROENTEROLOGY | Facility: CLINIC | Age: 57
End: 2017-03-08

## 2017-03-08 VITALS
WEIGHT: 182 LBS | SYSTOLIC BLOOD PRESSURE: 132 MMHG | HEIGHT: 66 IN | OXYGEN SATURATION: 98 % | DIASTOLIC BLOOD PRESSURE: 80 MMHG | BODY MASS INDEX: 29.25 KG/M2 | HEART RATE: 72 BPM

## 2017-03-08 DIAGNOSIS — Z78.9 NONSMOKER: ICD-10-CM

## 2017-03-08 DIAGNOSIS — R23.8 SKIN BREAKDOWN: Primary | ICD-10-CM

## 2017-03-08 PROCEDURE — 99211 OFF/OP EST MAY X REQ PHY/QHP: CPT | Performed by: CLINICAL NURSE SPECIALIST

## 2017-03-08 NOTE — PROGRESS NOTES
Chief Complaint   Patient presents with   • GI Problem     Here to discuss rectal irritation and recent colonoscopy         HPI    Familia Michael is a  56 y.o. male here for a follow up visit for a follow up visit for excoriated skin of the intergluteal cleft to the rectum with redness and skin breakdown. He has tried A&D and antibiotic ointment mixed to treat and it continues to be a problem for him. He has a colonoscopy scheduled for [facility] and change in bowels as well.   I have put him on a fiber regimen and this is helping. His bleeding is some improved as well. No fever chills or sweats. No wt loss. He is being treated currently for squamous cell carcinoma of the lung and he has completed his chemo/radiation.     Today he tells me that this is much improved with the Nystatin and zinc oxide cream that he has been using. No itching or burning. No pain. He feels much better.     Past Medical History   Diagnosis Date   • Arthritis    • Asthma    • Cancer      lung   • COPD (chronic obstructive pulmonary disease)    • Hypertension      Past Surgical History   Procedure Laterality Date   • Finger surgery       Reattachment of left ring finger   • Bronchoscopy     • Mediport insertion, single     • Colonoscopy  02/20/2017     normal exam repeat 10 years.       Outpatient Prescriptions Marked as Taking for the 3/8/17 encounter (Office Visit) with DOLORES Mcmahon   Medication Sig Dispense Refill   • albuterol (PROVENTIL HFA;VENTOLIN HFA) 108 (90 BASE) MCG/ACT inhaler Every 6 (Six) Hours.     • budesonide-formoterol (SYMBICORT) 160-4.5 MCG/ACT inhaler Inhale 2 puffs 2 (Two) Times a Day.     • FLUoxetine (PROzac) 10 MG capsule Take 10 mg by mouth Daily.     • lisinopril (PRINIVIL,ZESTRIL) 10 MG tablet Take 10 mg by mouth daily.     • omeprazole (priLOSEC) 20 MG capsule Take 40 mg by mouth daily.     • polyethylene glycol (GoLYTELY) 236 G solution Starting at noon on day prior to procedure, drink 8 ounces every  "30 minutes until all gone or stools are clear. May add flavor packet. 4000 mL 0   • sucralfate (CARAFATE) 1 G tablet Take 1 g by mouth 3 (Three) Times a Day.         Allergies   Allergen Reactions   • Tylenol With Codeine #3  [Acetaminophen-Codeine] Hives, Itching and Rash   • Naproxen      \"hurts my stomach\"   • Sulfa Antibiotics Rash       Social History     Social History   • Marital status: Single     Spouse name: N/A   • Number of children: N/A   • Years of education: N/A     Occupational History   • Not on file.     Social History Main Topics   • Smoking status: Former Smoker     Packs/day: 2.00     Years: 20.00     Types: Cigarettes   • Smokeless tobacco: Never Used   • Alcohol use No   • Drug use: No   • Sexual activity: Defer     Other Topics Concern   • Not on file     Social History Narrative       Family History   Problem Relation Age of Onset   • Colon cancer Neg Hx    • Colon polyps Neg Hx        Review of Systems   Constitutional: Negative for activity change, appetite change, chills, diaphoresis, fatigue, fever and unexpected weight change.   HENT: Negative for ear pain, hearing loss, mouth sores, sore throat, trouble swallowing and voice change.    Eyes: Negative.    Respiratory: Negative for cough, choking, shortness of breath and wheezing.    Cardiovascular: Negative for chest pain and palpitations.   Gastrointestinal: Negative for abdominal pain, blood in stool, constipation, diarrhea, nausea and vomiting.   Endocrine: Negative for cold intolerance and heat intolerance.   Genitourinary: Negative for decreased urine volume, dysuria, frequency, hematuria and urgency.   Musculoskeletal: Negative for back pain, gait problem and myalgias.   Skin: Negative for color change, pallor and rash.        Small area of skin break down intergluteal cleft top. improved   Allergic/Immunologic: Negative for food allergies and immunocompromised state.   Neurological: Negative for dizziness, tremors, seizures, " "syncope, weakness, light-headedness, numbness and headaches.   Hematological: Negative for adenopathy. Does not bruise/bleed easily.   Psychiatric/Behavioral: Negative for agitation and confusion. The patient is not nervous/anxious.    All other systems reviewed and are negative.      Visit Vitals   • /80   • Pulse 72   • Ht 66\" (167.6 cm)   • Wt 182 lb (82.6 kg)   • SpO2 98%   • BMI 29.38 kg/m2     Body mass index is 29.38 kg/(m^2).    Physical Exam   Constitutional: He is oriented to person, place, and time. He appears well-developed and well-nourished.   HENT:   Head: Normocephalic and atraumatic.   Eyes: Pupils are equal, round, and reactive to light.   Neck: Normal range of motion. Neck supple. No tracheal deviation present.   Cardiovascular: Normal rate, regular rhythm and normal heart sounds.  Exam reveals no gallop and no friction rub.    No murmur heard.  Pulmonary/Chest: Effort normal and breath sounds normal. No respiratory distress. He has no wheezes. He has no rales. He exhibits no tenderness.   Abdominal: Soft. Bowel sounds are normal. He exhibits no distension. There is no hepatosplenomegaly. There is no tenderness. There is no rigidity, no rebound and no guarding.   Genitourinary:   Genitourinary Comments: Small area of continued skin breakdown top 1 inch intergluteal cleft much improved.   Musculoskeletal: Normal range of motion. He exhibits no edema, tenderness or deformity.   Neurological: He is alert and oriented to person, place, and time. He has normal reflexes.   Skin: Skin is warm and dry. No rash noted. No pallor.   Psychiatric: He has a normal mood and affect. His behavior is normal. Judgment and thought content normal.       ASSESSMENT AND PLAN    Familia was seen today for gi problem.    Diagnoses and all orders for this visit:    Skin breakdown  Comments:  improved continue current treatment and continued follow up with PCP to call as needed..     Nonsmoker      He is aware to call " with any further problems. He is much improved and is aware to call if needed.   Return if symptoms worsen or fail to improve.

## 2017-03-09 ENCOUNTER — APPOINTMENT (OUTPATIENT)
Dept: ONCOLOGY | Facility: HOSPITAL | Age: 57
End: 2017-03-09

## 2017-04-26 ENCOUNTER — HOSPITAL ENCOUNTER (OUTPATIENT)
Dept: HOSPITAL 58 - LAB | Age: 57
Discharge: HOME | End: 2017-04-26
Attending: FAMILY MEDICINE

## 2017-04-26 VITALS — BODY MASS INDEX: 29 KG/M2

## 2017-04-26 DIAGNOSIS — F32.9: ICD-10-CM

## 2017-04-26 DIAGNOSIS — I10: Primary | ICD-10-CM

## 2017-04-26 DIAGNOSIS — Z79.899: ICD-10-CM

## 2017-04-26 DIAGNOSIS — C34.90: ICD-10-CM

## 2017-04-26 LAB
ADD URINE MICROSCOPIC: YES
ALBUMIN SERPL-MCNC: 3.9 G/DL (ref 3.4–5)
ALBUMIN/GLOB SERPL: 1.3 {RATIO}
ALP SERPL-CCNC: 66 U/L (ref 50–136)
ALT SERPL-CCNC: 30 U/L (ref 12–78)
ANION GAP SERPL CALC-SCNC: 12 MMOL/L
AST SERPL-CCNC: 16 U/L (ref 15–37)
BASOPHILS # BLD AUTO: 0 K/UL (ref 0–0.2)
BASOPHILS NFR BLD AUTO: 0.7 % (ref 0–3)
BILIRUB SERPL-MCNC: 0.39 MG/DL (ref 0–1.2)
BUN SERPL-MCNC: 22 MG/DL (ref 7–18)
BUN/CREAT SERPL: 25.88
CALCIUM SERPL-MCNC: 9 MG/DL (ref 8.2–10.2)
CHLORIDE SERPL-SCNC: 106 MMOL/L (ref 98–107)
CO2 BLD-SCNC: 25 MMOL/L (ref 21–32)
CREAT SERPL-MCNC: 0.85 MG/DL (ref 0.6–1.1)
EOSINOPHIL # BLD AUTO: 0.2 K/UL (ref 0–0.7)
EOSINOPHIL NFR BLD AUTO: 3 % (ref 0–7)
GFR SERPLBLD BASED ON 1.73 SQ M-ARVRAT: 93 ML/MIN
GLOBULIN SER CALC-MCNC: 3 G/L
GLUCOSE SERPL-MCNC: 100 MG/DL (ref 70–100)
HCT VFR BLD AUTO: 43.3 % (ref 42–52)
HGB BLD-MCNC: 15.2 G/DL (ref 14–18)
IMM GRANULOCYTES NFR BLD AUTO: 0.4 % (ref 0–5)
LYMPHOCYTES # SPEC AUTO: 1 K/UL (ref 0.6–3.4)
LYMPHOCYTES NFR BLD AUTO: 19.1 % (ref 10–50)
MCH RBC QN: 30.6 PG (ref 27–31)
MCHC RBC AUTO-ENTMCNC: 35.1 G/DL (ref 31.8–35.4)
MCV RBC: 87.3 FL (ref 80–94)
MONOCYTES # BLD AUTO: 0.4 K/UL (ref 0.4–2)
MONOCYTES NFR BLD AUTO: 8 % (ref 0–10)
NEUTROPHILS # BLD AUTO: 3.7 K/UL (ref 2–6.9)
NEUTROPHILS NFR BLD AUTO: 68.8 %
PH UR: 6 [PH] (ref 5–9)
PLATELET # BLD AUTO: 173 10^3/UL (ref 140–440)
POTASSIUM SERPL-SCNC: 4 MMOL/L (ref 3.5–5.1)
PROT SERPL-MCNC: 6.9 G/DL (ref 6.4–8.2)
RBC # BLD AUTO: 4.96 10^6/UL (ref 4.7–6.1)
SODIUM SERPL-SCNC: 139 MMOL/L (ref 136–145)
SP GR UR: 1.02 (ref 1–1.03)
WBC # BLD AUTO: 5.38 K/UL (ref 4.2–10.2)

## 2017-04-26 PROCEDURE — 36415 COLL VENOUS BLD VENIPUNCTURE: CPT

## 2017-04-26 PROCEDURE — 80053 COMPREHEN METABOLIC PANEL: CPT

## 2017-04-26 PROCEDURE — 85025 COMPLETE CBC W/AUTO DIFF WBC: CPT

## 2017-04-26 PROCEDURE — 81001 URINALYSIS AUTO W/SCOPE: CPT

## 2017-06-29 ENCOUNTER — OFFICE VISIT (OUTPATIENT)
Dept: RADIATION ONCOLOGY | Facility: HOSPITAL | Age: 57
End: 2017-06-29

## 2017-06-29 VITALS
BODY MASS INDEX: 29.73 KG/M2 | HEIGHT: 66 IN | SYSTOLIC BLOOD PRESSURE: 112 MMHG | WEIGHT: 185 LBS | DIASTOLIC BLOOD PRESSURE: 62 MMHG

## 2017-06-29 DIAGNOSIS — C34.91 SQUAMOUS CELL CARCINOMA OF RIGHT LUNG (HCC): Primary | ICD-10-CM

## 2017-06-29 DIAGNOSIS — Z08 ENCOUNTER FOR FOLLOW-UP SURVEILLANCE OF LUNG CANCER: ICD-10-CM

## 2017-06-29 DIAGNOSIS — Z85.118 ENCOUNTER FOR FOLLOW-UP SURVEILLANCE OF LUNG CANCER: ICD-10-CM

## 2017-06-29 DIAGNOSIS — Z78.9 NONSMOKER: ICD-10-CM

## 2017-06-29 DIAGNOSIS — Z92.3 HX OF RADIATION THERAPY: ICD-10-CM

## 2017-07-06 ENCOUNTER — APPOINTMENT (OUTPATIENT)
Dept: LAB | Facility: HOSPITAL | Age: 57
End: 2017-07-06
Attending: INTERNAL MEDICINE

## 2017-07-06 ENCOUNTER — TRANSCRIBE ORDERS (OUTPATIENT)
Dept: ADMINISTRATIVE | Facility: HOSPITAL | Age: 57
End: 2017-07-06

## 2017-07-06 ENCOUNTER — HOSPITAL ENCOUNTER (OUTPATIENT)
Dept: CT IMAGING | Facility: HOSPITAL | Age: 57
Discharge: HOME | End: 2017-07-06
Attending: INTERNAL MEDICINE | Admitting: INTERNAL MEDICINE

## 2017-07-06 DIAGNOSIS — C34.90 SQUAMOUS CELL CARCINOMA OF LUNG, UNSPECIFIED LATERALITY (HCC): Primary | ICD-10-CM

## 2017-07-06 DIAGNOSIS — C34.11 MALIGNANT NEOPLASM OF UPPER LOBE OF RIGHT LUNG (HCC): ICD-10-CM

## 2017-07-06 LAB
ALBUMIN SERPL-MCNC: 4.3 G/DL (ref 3.5–5)
ALBUMIN/GLOB SERPL: 1.6 G/DL (ref 1.1–2.5)
ALP SERPL-CCNC: 68 U/L (ref 24–120)
ALT SERPL W P-5'-P-CCNC: 47 U/L (ref 0–54)
ANION GAP SERPL CALCULATED.3IONS-SCNC: 11 MMOL/L (ref 4–13)
AST SERPL-CCNC: 29 U/L (ref 7–45)
BASOPHILS # BLD AUTO: 0.04 10*3/MM3 (ref 0–0.2)
BASOPHILS NFR BLD AUTO: 0.7 % (ref 0–2)
BILIRUB SERPL-MCNC: 0.4 MG/DL (ref 0.1–1)
BUN BLD-MCNC: 14 MG/DL (ref 5–21)
BUN/CREAT SERPL: 16.5 (ref 7–25)
CALCIUM SPEC-SCNC: 9 MG/DL (ref 8.4–10.4)
CEA SERPL-MCNC: 1.54 NG/ML (ref 0–5)
CHLORIDE SERPL-SCNC: 102 MMOL/L (ref 98–110)
CO2 SERPL-SCNC: 27 MMOL/L (ref 24–31)
CREAT BLD-MCNC: 0.85 MG/DL (ref 0.5–1.4)
CREAT BLDA-MCNC: 0.9 MG/DL (ref 0.6–1.3)
DEPRECATED RDW RBC AUTO: 42.8 FL (ref 40–54)
EOSINOPHIL # BLD AUTO: 0.25 10*3/MM3 (ref 0–0.7)
EOSINOPHIL NFR BLD AUTO: 4.4 % (ref 0–4)
ERYTHROCYTE [DISTWIDTH] IN BLOOD BY AUTOMATED COUNT: 13.1 % (ref 12–15)
GFR SERPL CREATININE-BSD FRML MDRD: 93 ML/MIN/1.73
GLOBULIN UR ELPH-MCNC: 2.7 GM/DL
GLUCOSE BLD-MCNC: 88 MG/DL (ref 70–100)
HCT VFR BLD AUTO: 42.9 % (ref 40–52)
HGB BLD-MCNC: 14.5 G/DL (ref 14–18)
IMM GRANULOCYTES # BLD: 0.03 10*3/MM3 (ref 0–0.03)
IMM GRANULOCYTES NFR BLD: 0.5 % (ref 0–5)
LYMPHOCYTES # BLD AUTO: 1.25 10*3/MM3 (ref 0.72–4.86)
LYMPHOCYTES NFR BLD AUTO: 21.9 % (ref 15–45)
MCH RBC QN AUTO: 30.4 PG (ref 28–32)
MCHC RBC AUTO-ENTMCNC: 33.8 G/DL (ref 33–36)
MCV RBC AUTO: 89.9 FL (ref 82–95)
MONOCYTES # BLD AUTO: 0.5 10*3/MM3 (ref 0.19–1.3)
MONOCYTES NFR BLD AUTO: 8.7 % (ref 4–12)
NEUTROPHILS # BLD AUTO: 3.65 10*3/MM3 (ref 1.87–8.4)
NEUTROPHILS NFR BLD AUTO: 63.8 % (ref 39–78)
PLATELET # BLD AUTO: 153 10*3/MM3 (ref 130–400)
PMV BLD AUTO: 10.8 FL (ref 6–12)
POTASSIUM BLD-SCNC: 4.1 MMOL/L (ref 3.5–5.3)
PROT SERPL-MCNC: 7 G/DL (ref 6.3–8.7)
RBC # BLD AUTO: 4.77 10*6/MM3 (ref 4.8–5.9)
SODIUM BLD-SCNC: 140 MMOL/L (ref 135–145)
WBC NRBC COR # BLD: 5.72 10*3/MM3 (ref 4.8–10.8)

## 2017-07-06 PROCEDURE — 85025 COMPLETE CBC W/AUTO DIFF WBC: CPT | Performed by: INTERNAL MEDICINE

## 2017-07-06 PROCEDURE — 80053 COMPREHEN METABOLIC PANEL: CPT | Performed by: INTERNAL MEDICINE

## 2017-07-06 PROCEDURE — 0 IOPAMIDOL 61 % SOLUTION: Performed by: INTERNAL MEDICINE

## 2017-07-06 PROCEDURE — 82565 ASSAY OF CREATININE: CPT

## 2017-07-06 PROCEDURE — 36415 COLL VENOUS BLD VENIPUNCTURE: CPT | Performed by: INTERNAL MEDICINE

## 2017-07-06 PROCEDURE — 82378 CARCINOEMBRYONIC ANTIGEN: CPT | Performed by: INTERNAL MEDICINE

## 2017-07-06 PROCEDURE — 71260 CT THORAX DX C+: CPT

## 2017-07-06 RX ADMIN — IOPAMIDOL 100 ML: 612 INJECTION, SOLUTION INTRAVENOUS at 10:15

## 2017-07-13 ENCOUNTER — TRANSCRIBE ORDERS (OUTPATIENT)
Dept: ADMINISTRATIVE | Facility: HOSPITAL | Age: 57
End: 2017-07-13

## 2017-07-13 DIAGNOSIS — C34.11 MALIGNANT NEOPLASM OF UPPER LOBE OF RIGHT LUNG (HCC): Primary | ICD-10-CM

## 2017-07-27 ENCOUNTER — HOSPITAL ENCOUNTER (OUTPATIENT)
Dept: HOSPITAL 58 - RAD | Age: 57
Discharge: HOME | End: 2017-07-27
Attending: FAMILY MEDICINE

## 2017-07-27 VITALS — BODY MASS INDEX: 29 KG/M2

## 2017-07-27 DIAGNOSIS — M54.5: Primary | ICD-10-CM

## 2017-07-27 DIAGNOSIS — M47.817: ICD-10-CM

## 2017-07-27 DIAGNOSIS — Z85.118: ICD-10-CM

## 2017-07-27 DIAGNOSIS — M51.37: ICD-10-CM

## 2017-07-27 NOTE — DI
Exam:  Lumbar spine three-view. 

  

HISTORY:  Spondylosis in back. 

  

Comparison:  11/16/2016. 

  

Findings:  Three images of the lumbar spine are submitted.  These demonstrate five non-rib-bearing, 
lumbarized vertebrae plus a transitional S1 which articulates with the sacrum on the left.  There is
 mild dextroscoliosis.  There is multilevel degenerative disc and facet arthropathy as before.  Ther
e is 2 mm retrolisthesis of L3 on L4.  The bowel gas pattern is nondilated. 

  

Impressions:  No compression fracture in the lumbar spine. 

  

Stable mild dextroscoliosis with multilevel degenerative disc and facet arthropathy. 

  

Stable 2 mm retrolisthesis of L3 on L4.

## 2017-08-08 ENCOUNTER — HOSPITAL ENCOUNTER (OUTPATIENT)
Dept: HOSPITAL 58 - LAB | Age: 57
Discharge: HOME | End: 2017-08-08
Attending: FAMILY MEDICINE

## 2017-08-08 VITALS — BODY MASS INDEX: 29 KG/M2

## 2017-08-08 DIAGNOSIS — J02.9: Primary | ICD-10-CM

## 2017-08-08 PROCEDURE — 87880 STREP A ASSAY W/OPTIC: CPT

## 2017-08-08 PROCEDURE — 87651 STREP A DNA AMP PROBE: CPT

## 2017-09-11 ENCOUNTER — HOSPITAL ENCOUNTER (OUTPATIENT)
Dept: HOSPITAL 58 - RAD | Age: 57
Discharge: HOME | End: 2017-09-11
Attending: FAMILY MEDICINE

## 2017-09-11 VITALS — BODY MASS INDEX: 29 KG/M2

## 2017-09-11 DIAGNOSIS — S60.457A: Primary | ICD-10-CM

## 2017-09-11 DIAGNOSIS — L08.9: ICD-10-CM

## 2017-09-11 NOTE — DI
EXAM:  Fifth digit of the left hand three views 

  

HISTORY:  Foreign body, infection 

  

FINDINGS / IMPRESSION:  Mild arthritic spurring of the joints.  No soft tissue radiopaque foreign bod
y or obvious gas collection is seen.  No acute fracture or dislocation.

## 2017-10-24 PROBLEM — Z85.118 ENCOUNTER FOR FOLLOW-UP SURVEILLANCE OF LUNG CANCER: Status: ACTIVE | Noted: 2017-10-24

## 2017-10-24 PROBLEM — Z08 ENCOUNTER FOR FOLLOW-UP SURVEILLANCE OF LUNG CANCER: Status: ACTIVE | Noted: 2017-10-24

## 2017-11-13 ENCOUNTER — HOSPITAL ENCOUNTER (OUTPATIENT)
Dept: HOSPITAL 58 - RAD | Age: 57
Discharge: HOME | End: 2017-11-13
Attending: FAMILY MEDICINE

## 2017-11-13 VITALS — BODY MASS INDEX: 29 KG/M2

## 2017-11-13 DIAGNOSIS — M54.5: ICD-10-CM

## 2017-11-13 DIAGNOSIS — M51.17: Primary | ICD-10-CM

## 2017-11-13 NOTE — MRI
EXAM:  Lumbar spine MRI without contrast. 

  

HISTORY:  Degenerative disc disease with radiculopathy. 

  

COMPARISON:  Lumbar spine radiographs 07/27/2017 lumbar spine CT scan 07/17/2015. 

  

TECHNIQUE:  Multiplanar, multisequence MR images were acquired of the lumbar spine without contrast. 


  

FINDINGS:  Transitional spinal anatomy is present.  The last normal rib-bearing thoracic vertebrae nu
mbered T12.  At L1, there are considered unusual articulating transverse processes bilaterally or sma
ll ribs.  S1 is a transitional vertebra with right lumbarization and and a hypertrophic pseudoarticul
ation between the left first and second sacral segments.  There is mild thoracolumbar rotatory dextro
scoliosis centered at L2-3 and compensatory levoscoliosis at L5-S1.  There is loss of the usual isabela
h lumbar lordosis with 2.8 mm retrolisthesis of L1 on L2, 3.3 mm retrolisthesis of L2 on L3, 2.9 mm r
etrolisthesis of L3, L4 and 3 mm anterolisthesis of L5 on S1.  There is minor chronic anterior wedgin
g of the T12 and L1 vertebra that is considered developmental. Bridging ventral and lateral osteophyt
es are present in the lumbar spine and there is desiccation of the intervertebral discs from L1-2 to 
L5-S1.  There is osteophytosis with mild disc space narrowing and degenerative endplate changes that 
is greatest right laterally at L1-2.  There is osteophytosis with moderate to marked disc space narro
wing and moderate degenerative endplate changes which are greatest left laterally at L2-3 and L3-4.  
Heterogeneous bright STIR signal edema and dark STIR signal sclerosis is present along the anterior a
nd left lateral endplates at L2-3. There is osteophytosis with moderate disc space narrowing that is 
asymmetric to the right at L5-S1.  A small disc is present at S1-2.  Chronic Schmorl's nodes are pres
ent from T11 to L4.  Conus medullaris ends at L1-2 and has normal signal intensity.  Canal diameter i
s developmentally narrow due to congenitally short pedicles. 

  

The partially visualized liver, spleen and kidneys are unremarkable. 

  

T12-L1:  The intervertebral disc is normal.  There is minor irregular concavity of the endplates. 

  

L1-2:  There is a mild disc bulge that is asymmetric to the right with large bridging right ventrolat
eral and small left anterior endplate osteophytes.  There is a small superimposed bilobed bilateral p
aracentral disc protrusion, larger on the right that effaces the ventral thecal sac.  There is promin
ent dorsal epidural fat and there is minor left foraminal stenosis.  There is no central canal stenos
is. 

  

L2-3:  There is a moderate diffuse spondylotic disc bulge that is asymmetric to the left which narrow
s the inferior left neural foramen and encroaches on the exiting left L2 nerve.  There is a small sup
erimposed central and left paracentral disc extrusion with inferior migration that effaces the left a
nterior subarachnoid space and left thecal sac with encroachment on the left L3 nerve roots.  Promine
nt dorsal epidural fat is present.  These findings cause mild to moderate spinal stenosis and mild ri
ght and mild to moderate left neural foraminal stenosis.  AP diameter of the thecal sac is 5.4 mm. 

  

L3-4:  There is a moderate diffuse spondylotic ridge that is asymmetric to the left which effaces the
 ventral thecal sac, narrows the inferior neural foramina bilaterally and encroaches on the exiting l
eft L3 nerve.  Mild right and moderate left hypertrophic facet arthropathy and ligamentum flavum hype
rtrophy and prominent dorsal epidural fat is present.  There is moderately severe spinal stenosis wit
h crowding and traction on the nerve roots and mild to moderate right and moderate left neural forami
nal stenosis.  AP diameter of the thecal sac is 4.9 mm. 

  

L4-5:  There is a mild diffuse disc bulge, moderate bilateral hypertrophic facet arthropathy and prom
inent dorsal epidural fat.  In this patient with a developmentally narrow canal, this causes moderate
ly severe spinal stenosis with crowding and traction of the nerve roots and mild to moderate left and
 mild right foraminal stenosis.  AP diameter of the thecal sac is 5.4 mm. 

  

L5-S1:  There is a diffuse disc bulge that is asymmetric to the right with moderate right far endplat
e osteophytes.  Moderate left and severe right hypertrophic facet arthropathy and moderate bilateral 
ligamentum flavum hypertrophy is present..  There is mild spinal stenosis and mild left and mild to m
oderate right neural foramen stenosis.  There is a left foraminal annular tear and the bulging disc e
ncroaches on the exiting left L5 nerve.  AP diameter of the thecal sac is 8 mm. 

  

S1-2:  The intervertebral disc is normal. 

  

IMPRESSION: 

1.  Mild thoracolumbar rotatory scoliosis, convex right at L2-3 and compensatory convex left at L5-S1
. 

2.  Moderate lumbar degenerative spondylosis most significant at L2-3 and L3-4. 

3.  Mild L5-S1, mild to moderate L2-3 and moderately severe L3-4 and L4-5 central canal stenosis. 

4.  Small broad-based bilobed bilateral paracentral disc protrusion L1-2. 

5.  Multilevel foraminal stenosis.

## 2017-12-21 ENCOUNTER — TRANSCRIBE ORDERS (OUTPATIENT)
Dept: ADMINISTRATIVE | Facility: HOSPITAL | Age: 57
End: 2017-12-21

## 2017-12-21 ENCOUNTER — HOSPITAL ENCOUNTER (OUTPATIENT)
Dept: CT IMAGING | Facility: HOSPITAL | Age: 57
Discharge: HOME | End: 2017-12-21
Attending: INTERNAL MEDICINE | Admitting: INTERNAL MEDICINE

## 2017-12-21 ENCOUNTER — APPOINTMENT (OUTPATIENT)
Dept: LAB | Facility: HOSPITAL | Age: 57
End: 2017-12-21
Attending: INTERNAL MEDICINE

## 2017-12-21 DIAGNOSIS — C34.91 SQUAMOUS CELL CARCINOMA OF LUNG, RIGHT (HCC): Primary | ICD-10-CM

## 2017-12-21 DIAGNOSIS — C34.11 MALIGNANT NEOPLASM OF UPPER LOBE OF RIGHT LUNG (HCC): ICD-10-CM

## 2017-12-21 LAB
ALBUMIN SERPL-MCNC: 4.3 G/DL (ref 3.5–5)
ALBUMIN/GLOB SERPL: 1.5 G/DL (ref 1.1–2.5)
ALP SERPL-CCNC: 66 U/L (ref 24–120)
ALT SERPL W P-5'-P-CCNC: 51 U/L (ref 0–54)
ANION GAP SERPL CALCULATED.3IONS-SCNC: 11 MMOL/L (ref 4–13)
AST SERPL-CCNC: 24 U/L (ref 7–45)
BASOPHILS # BLD AUTO: 0.03 10*3/MM3 (ref 0–0.2)
BASOPHILS NFR BLD AUTO: 0.5 % (ref 0–2)
BILIRUB SERPL-MCNC: 0.4 MG/DL (ref 0.1–1)
BUN BLD-MCNC: 17 MG/DL (ref 5–21)
BUN/CREAT SERPL: 17.9 (ref 7–25)
CALCIUM SPEC-SCNC: 9.2 MG/DL (ref 8.4–10.4)
CEA SERPL-MCNC: 1.6 NG/ML (ref 0–5)
CHLORIDE SERPL-SCNC: 102 MMOL/L (ref 98–110)
CO2 SERPL-SCNC: 28 MMOL/L (ref 24–31)
CREAT BLD-MCNC: 0.95 MG/DL (ref 0.5–1.4)
CREAT BLDA-MCNC: 1 MG/DL (ref 0.6–1.3)
DEPRECATED RDW RBC AUTO: 41.6 FL (ref 40–54)
EOSINOPHIL # BLD AUTO: 0.22 10*3/MM3 (ref 0–0.7)
EOSINOPHIL NFR BLD AUTO: 3.4 % (ref 0–4)
ERYTHROCYTE [DISTWIDTH] IN BLOOD BY AUTOMATED COUNT: 12.7 % (ref 12–15)
GFR SERPL CREATININE-BSD FRML MDRD: 82 ML/MIN/1.73
GLOBULIN UR ELPH-MCNC: 2.9 GM/DL
GLUCOSE BLD-MCNC: 94 MG/DL (ref 70–100)
HCT VFR BLD AUTO: 42.3 % (ref 40–52)
HGB BLD-MCNC: 14.1 G/DL (ref 14–18)
IMM GRANULOCYTES # BLD: 0.02 10*3/MM3 (ref 0–0.03)
IMM GRANULOCYTES NFR BLD: 0.3 % (ref 0–5)
LYMPHOCYTES # BLD AUTO: 1.09 10*3/MM3 (ref 0.72–4.86)
LYMPHOCYTES NFR BLD AUTO: 16.9 % (ref 15–45)
MCH RBC QN AUTO: 29.6 PG (ref 28–32)
MCHC RBC AUTO-ENTMCNC: 33.3 G/DL (ref 33–36)
MCV RBC AUTO: 88.9 FL (ref 82–95)
MONOCYTES # BLD AUTO: 0.56 10*3/MM3 (ref 0.19–1.3)
MONOCYTES NFR BLD AUTO: 8.7 % (ref 4–12)
NEUTROPHILS # BLD AUTO: 4.54 10*3/MM3 (ref 1.87–8.4)
NEUTROPHILS NFR BLD AUTO: 70.2 % (ref 39–78)
NRBC BLD MANUAL-RTO: 0 /100 WBC (ref 0–0)
PLATELET # BLD AUTO: 177 10*3/MM3 (ref 130–400)
PMV BLD AUTO: 10.2 FL (ref 6–12)
POTASSIUM BLD-SCNC: 3.8 MMOL/L (ref 3.5–5.3)
PROT SERPL-MCNC: 7.2 G/DL (ref 6.3–8.7)
RBC # BLD AUTO: 4.76 10*6/MM3 (ref 4.8–5.9)
SODIUM BLD-SCNC: 141 MMOL/L (ref 135–145)
WBC NRBC COR # BLD: 6.46 10*3/MM3 (ref 4.8–10.8)

## 2017-12-21 PROCEDURE — 0 IOPAMIDOL 61 % SOLUTION: Performed by: INTERNAL MEDICINE

## 2017-12-21 PROCEDURE — 80053 COMPREHEN METABOLIC PANEL: CPT | Performed by: INTERNAL MEDICINE

## 2017-12-21 PROCEDURE — 36415 COLL VENOUS BLD VENIPUNCTURE: CPT

## 2017-12-21 PROCEDURE — 82378 CARCINOEMBRYONIC ANTIGEN: CPT | Performed by: INTERNAL MEDICINE

## 2017-12-21 PROCEDURE — 71260 CT THORAX DX C+: CPT

## 2017-12-21 PROCEDURE — 85025 COMPLETE CBC W/AUTO DIFF WBC: CPT | Performed by: INTERNAL MEDICINE

## 2017-12-21 PROCEDURE — 82565 ASSAY OF CREATININE: CPT

## 2017-12-21 RX ADMIN — IOPAMIDOL 100 ML: 612 INJECTION, SOLUTION INTRAVENOUS at 10:30

## 2018-01-10 ENCOUNTER — APPOINTMENT (OUTPATIENT)
Dept: CT IMAGING | Facility: HOSPITAL | Age: 58
End: 2018-01-10

## 2018-01-10 ENCOUNTER — APPOINTMENT (OUTPATIENT)
Dept: CARDIOLOGY | Facility: HOSPITAL | Age: 58
End: 2018-01-10

## 2018-01-10 ENCOUNTER — HOSPITAL ENCOUNTER (EMERGENCY)
Facility: HOSPITAL | Age: 58
Discharge: HOME | End: 2018-01-10
Admitting: EMERGENCY MEDICINE

## 2018-01-10 ENCOUNTER — APPOINTMENT (OUTPATIENT)
Dept: GENERAL RADIOLOGY | Facility: HOSPITAL | Age: 58
End: 2018-01-10

## 2018-01-10 VITALS
OXYGEN SATURATION: 94 % | DIASTOLIC BLOOD PRESSURE: 86 MMHG | HEART RATE: 76 BPM | RESPIRATION RATE: 14 BRPM | WEIGHT: 181 LBS | HEIGHT: 66 IN | TEMPERATURE: 98.7 F | BODY MASS INDEX: 29.09 KG/M2 | SYSTOLIC BLOOD PRESSURE: 126 MMHG

## 2018-01-10 DIAGNOSIS — M94.0 ACUTE COSTOCHONDRITIS: Primary | ICD-10-CM

## 2018-01-10 LAB
ALBUMIN SERPL-MCNC: 4.5 G/DL (ref 3.5–5)
ALBUMIN/GLOB SERPL: 1.5 G/DL (ref 1.1–2.5)
ALP SERPL-CCNC: 72 U/L (ref 24–120)
ALT SERPL W P-5'-P-CCNC: 44 U/L (ref 0–54)
ANION GAP SERPL CALCULATED.3IONS-SCNC: 14 MMOL/L (ref 4–13)
AST SERPL-CCNC: 39 U/L (ref 7–45)
BASOPHILS # BLD AUTO: 0.04 10*3/MM3 (ref 0–0.2)
BASOPHILS NFR BLD AUTO: 0.6 % (ref 0–2)
BH CV STRESS BP STAGE 1: NORMAL
BH CV STRESS BP STAGE 2: NORMAL
BH CV STRESS BP STAGE 3: NORMAL
BH CV STRESS DOSE DOBUTAMINE STAGE 1: 10
BH CV STRESS DOSE DOBUTAMINE STAGE 2: 20
BH CV STRESS DOSE DOBUTAMINE STAGE 3: 30
BH CV STRESS DURATION MIN STAGE 1: 3
BH CV STRESS DURATION MIN STAGE 2: 3
BH CV STRESS DURATION MIN STAGE 3: 3
BH CV STRESS DURATION SEC STAGE 1: 0
BH CV STRESS DURATION SEC STAGE 2: 0
BH CV STRESS DURATION SEC STAGE 3: 4
BH CV STRESS HR STAGE 1: 75
BH CV STRESS HR STAGE 2: 102
BH CV STRESS HR STAGE 3: 150
BH CV STRESS PROTOCOL 1: NORMAL
BH CV STRESS RECOVERY BP: NORMAL MMHG
BH CV STRESS RECOVERY HR: 86 BPM
BH CV STRESS STAGE 1: 1
BH CV STRESS STAGE 2: 2
BH CV STRESS STAGE 3: 3
BILIRUB SERPL-MCNC: 0.7 MG/DL (ref 0.1–1)
BUN BLD-MCNC: 17 MG/DL (ref 5–21)
BUN/CREAT SERPL: 19.1 (ref 7–25)
CALCIUM SPEC-SCNC: 9.5 MG/DL (ref 8.4–10.4)
CHLORIDE SERPL-SCNC: 103 MMOL/L (ref 98–110)
CO2 SERPL-SCNC: 23 MMOL/L (ref 24–31)
CREAT BLD-MCNC: 0.89 MG/DL (ref 0.5–1.4)
D DIMER PPP FEU-MCNC: 0.32 MG/L (FEU) (ref 0–0.5)
DEPRECATED RDW RBC AUTO: 39.8 FL (ref 40–54)
EOSINOPHIL # BLD AUTO: 0.17 10*3/MM3 (ref 0–0.7)
EOSINOPHIL NFR BLD AUTO: 2.6 % (ref 0–4)
ERYTHROCYTE [DISTWIDTH] IN BLOOD BY AUTOMATED COUNT: 12.5 % (ref 12–15)
GFR SERPL CREATININE-BSD FRML MDRD: 88 ML/MIN/1.73
GLOBULIN UR ELPH-MCNC: 3.1 GM/DL
GLUCOSE BLD-MCNC: 113 MG/DL (ref 70–100)
HCT VFR BLD AUTO: 44.9 % (ref 40–52)
HGB BLD-MCNC: 15.2 G/DL (ref 14–18)
HOLD SPECIMEN: NORMAL
HOLD SPECIMEN: NORMAL
IMM GRANULOCYTES # BLD: 0.03 10*3/MM3 (ref 0–0.03)
IMM GRANULOCYTES NFR BLD: 0.5 % (ref 0–5)
LYMPHOCYTES # BLD AUTO: 1.07 10*3/MM3 (ref 0.72–4.86)
LYMPHOCYTES NFR BLD AUTO: 16.1 % (ref 15–45)
MAXIMAL PREDICTED HEART RATE: 162 BPM
MCH RBC QN AUTO: 29.5 PG (ref 28–32)
MCHC RBC AUTO-ENTMCNC: 33.9 G/DL (ref 33–36)
MCV RBC AUTO: 87 FL (ref 82–95)
MONOCYTES # BLD AUTO: 0.62 10*3/MM3 (ref 0.19–1.3)
MONOCYTES NFR BLD AUTO: 9.3 % (ref 4–12)
NEUTROPHILS # BLD AUTO: 4.72 10*3/MM3 (ref 1.87–8.4)
NEUTROPHILS NFR BLD AUTO: 70.9 % (ref 39–78)
NRBC BLD MANUAL-RTO: 0 /100 WBC (ref 0–0)
PERCENT MAX PREDICTED HR: 92.59 %
PLATELET # BLD AUTO: 198 10*3/MM3 (ref 130–400)
PMV BLD AUTO: 10.5 FL (ref 6–12)
POTASSIUM BLD-SCNC: 4 MMOL/L (ref 3.5–5.3)
PROT SERPL-MCNC: 7.6 G/DL (ref 6.3–8.7)
RBC # BLD AUTO: 5.16 10*6/MM3 (ref 4.8–5.9)
SODIUM BLD-SCNC: 140 MMOL/L (ref 135–145)
STRESS BASELINE BP: NORMAL MMHG
STRESS BASELINE HR: 57 BPM
STRESS PERCENT HR: 109 %
STRESS POST EXERCISE DUR MIN: 9 MIN
STRESS POST EXERCISE DUR SEC: 4 SEC
STRESS POST PEAK BP: NORMAL MMHG
STRESS POST PEAK HR: 150 BPM
STRESS TARGET HR: 138 BPM
TROPONIN I SERPL-MCNC: <0.012 NG/ML (ref 0–0.03)
TROPONIN I SERPL-MCNC: <0.012 NG/ML (ref 0–0.03)
WBC NRBC COR # BLD: 6.65 10*3/MM3 (ref 4.8–10.8)
WHOLE BLOOD HOLD SPECIMEN: NORMAL
WHOLE BLOOD HOLD SPECIMEN: NORMAL

## 2018-01-10 PROCEDURE — 25010000002 PERFLUTREN 6.52 MG/ML SUSPENSION: Performed by: INTERNAL MEDICINE

## 2018-01-10 PROCEDURE — 25010000003 DOBUTAMINE PER 250 MG: Performed by: INTERNAL MEDICINE

## 2018-01-10 PROCEDURE — 93017 CV STRESS TEST TRACING ONLY: CPT

## 2018-01-10 PROCEDURE — 93350 STRESS TTE ONLY: CPT | Performed by: INTERNAL MEDICINE

## 2018-01-10 PROCEDURE — 71045 X-RAY EXAM CHEST 1 VIEW: CPT

## 2018-01-10 PROCEDURE — 96374 THER/PROPH/DIAG INJ IV PUSH: CPT

## 2018-01-10 PROCEDURE — 85379 FIBRIN DEGRADATION QUANT: CPT | Performed by: PHYSICIAN ASSISTANT

## 2018-01-10 PROCEDURE — 93350 STRESS TTE ONLY: CPT

## 2018-01-10 PROCEDURE — 93018 CV STRESS TEST I&R ONLY: CPT | Performed by: INTERNAL MEDICINE

## 2018-01-10 PROCEDURE — 0 IOPAMIDOL PER 1 ML: Performed by: PHYSICIAN ASSISTANT

## 2018-01-10 PROCEDURE — 71275 CT ANGIOGRAPHY CHEST: CPT

## 2018-01-10 PROCEDURE — 80053 COMPREHEN METABOLIC PANEL: CPT

## 2018-01-10 PROCEDURE — 96375 TX/PRO/DX INJ NEW DRUG ADDON: CPT

## 2018-01-10 PROCEDURE — 25010000002 KETOROLAC TROMETHAMINE PER 15 MG: Performed by: PHYSICIAN ASSISTANT

## 2018-01-10 PROCEDURE — 85025 COMPLETE CBC W/AUTO DIFF WBC: CPT

## 2018-01-10 PROCEDURE — 25010000002 ONDANSETRON PER 1 MG: Performed by: PHYSICIAN ASSISTANT

## 2018-01-10 PROCEDURE — 84484 ASSAY OF TROPONIN QUANT: CPT

## 2018-01-10 PROCEDURE — 93352 ADMIN ECG CONTRAST AGENT: CPT | Performed by: INTERNAL MEDICINE

## 2018-01-10 PROCEDURE — 93010 ELECTROCARDIOGRAM REPORT: CPT | Performed by: INTERNAL MEDICINE

## 2018-01-10 PROCEDURE — 99285 EMERGENCY DEPT VISIT HI MDM: CPT

## 2018-01-10 PROCEDURE — 93005 ELECTROCARDIOGRAM TRACING: CPT

## 2018-01-10 RX ORDER — ONDANSETRON 2 MG/ML
4 INJECTION INTRAMUSCULAR; INTRAVENOUS ONCE
Status: COMPLETED | OUTPATIENT
Start: 2018-01-10 | End: 2018-01-10

## 2018-01-10 RX ORDER — ONDANSETRON 2 MG/ML
4 INJECTION INTRAMUSCULAR; INTRAVENOUS ONCE
Status: DISCONTINUED | OUTPATIENT
Start: 2018-01-10 | End: 2018-01-10 | Stop reason: HOSPADM

## 2018-01-10 RX ORDER — NITROGLYCERIN 0.4 MG/1
0.4 TABLET SUBLINGUAL ONCE
Status: COMPLETED | OUTPATIENT
Start: 2018-01-10 | End: 2018-01-10

## 2018-01-10 RX ORDER — BUDESONIDE AND FORMOTEROL FUMARATE DIHYDRATE 160; 4.5 UG/1; UG/1
2 AEROSOL RESPIRATORY (INHALATION)
COMMUNITY

## 2018-01-10 RX ORDER — KETOROLAC TROMETHAMINE 30 MG/ML
30 INJECTION, SOLUTION INTRAMUSCULAR; INTRAVENOUS ONCE
Status: COMPLETED | OUTPATIENT
Start: 2018-01-10 | End: 2018-01-10

## 2018-01-10 RX ORDER — DOBUTAMINE HYDROCHLORIDE 100 MG/100ML
10 INJECTION INTRAVENOUS
Status: DISCONTINUED | OUTPATIENT
Start: 2018-01-10 | End: 2018-01-10 | Stop reason: HOSPADM

## 2018-01-10 RX ORDER — MELOXICAM 15 MG/1
15 TABLET ORAL DAILY
COMMUNITY
End: 2018-06-28 | Stop reason: ALTCHOICE

## 2018-01-10 RX ORDER — FLUOXETINE HYDROCHLORIDE 20 MG/1
20 CAPSULE ORAL DAILY
COMMUNITY

## 2018-01-10 RX ORDER — ASPIRIN 81 MG/1
324 TABLET, CHEWABLE ORAL ONCE
Status: COMPLETED | OUTPATIENT
Start: 2018-01-10 | End: 2018-01-10

## 2018-01-10 RX ORDER — TRAMADOL HYDROCHLORIDE 50 MG/1
100 TABLET ORAL 2 TIMES DAILY PRN
COMMUNITY

## 2018-01-10 RX ORDER — SODIUM CHLORIDE 0.9 % (FLUSH) 0.9 %
10 SYRINGE (ML) INJECTION AS NEEDED
Status: DISCONTINUED | OUTPATIENT
Start: 2018-01-10 | End: 2018-01-10 | Stop reason: HOSPADM

## 2018-01-10 RX ORDER — LISINOPRIL 5 MG/1
5 TABLET ORAL DAILY
COMMUNITY

## 2018-01-10 RX ADMIN — KETOROLAC TROMETHAMINE 30 MG: 30 INJECTION, SOLUTION INTRAMUSCULAR at 14:40

## 2018-01-10 RX ADMIN — ONDANSETRON 4 MG: 2 INJECTION, SOLUTION INTRAMUSCULAR; INTRAVENOUS at 11:42

## 2018-01-10 RX ADMIN — NITROGLYCERIN 0.4 MG: 0.4 TABLET SUBLINGUAL at 11:42

## 2018-01-10 RX ADMIN — ASPIRIN 81 MG 324 MG: 81 TABLET ORAL at 11:35

## 2018-01-10 RX ADMIN — PERFLUTREN 8.48 MG: 6.52 INJECTION, SUSPENSION INTRAVENOUS at 16:08

## 2018-01-10 RX ADMIN — Medication 10 MCG/KG/MIN: at 16:09

## 2018-01-10 RX ADMIN — IOPAMIDOL 150 ML: 755 INJECTION, SOLUTION INTRAVENOUS at 13:48

## 2018-01-10 NOTE — ED PROVIDER NOTES
Subjective   HPI Comments: The patient states that he has had a slight cough, but nothing out of the ordinary.  He states that he started to have chest pain today on the left.  He states that it was about a 7/10 at worst and nothing seemed to change it.  He went to Dr. Johnson for his routine follow-up as he has a history of lung cancer that has been in remission for one year.  He states that his BP was high when he was there as well, so he was advised to go to ER.  He states that he had pain like this once before and was taken to [facility] about one year ago. He states that he had relief of these symptoms with Nitro when there and was admitted there and had stress test that was read as negative per patient.  He has not had problems since until today.    Patient is a 57 y.o. male presenting with chest pain.   History provided by:  Patient   used: No    Chest Pain   Pain location:  L chest  Pain quality: pressure    Pain radiates to:  Does not radiate  Pain severity:  Moderate  Onset quality:  Sudden  Duration:  4 hours  Timing:  Constant  Progression:  Unchanged  Chronicity:  New  Context: intercourse and at rest    Context: not breathing, not lifting and not raising an arm    Relieved by:  Nothing  Worsened by:  Nothing  Ineffective treatments:  None tried  Associated symptoms: cough    Associated symptoms: no abdominal pain, no AICD problem, no anorexia, no anxiety, no dysphagia, no fatigue, no fever, no nausea, no orthopnea, no palpitations, no syncope and no vomiting        Review of Systems   Constitutional: Negative for fatigue and fever.   HENT: Negative for trouble swallowing.    Eyes: Negative.    Respiratory: Positive for cough.    Cardiovascular: Positive for chest pain. Negative for palpitations, orthopnea and syncope.   Gastrointestinal: Negative for abdominal pain, anorexia, nausea and vomiting.   Musculoskeletal: Negative.    Skin: Negative.    Psychiatric/Behavioral: Negative.   "      Past Medical History:   Diagnosis Date   • Arthritis    • Asthma    • Cancer     lung   • COPD (chronic obstructive pulmonary disease)    • Hypertension        Allergies   Allergen Reactions   • Sulfa Antibiotics Hives     \"blisters\"   • Naproxen      \"hurts my stomach\"   • Tylenol With Codeine #3 [Acetaminophen-Codeine] Itching and Rash       Past Surgical History:   Procedure Laterality Date   • BRONCHOSCOPY     • COLONOSCOPY  02/20/2017    normal exam repeat 10 years.   • FINGER SURGERY      Reattachment of left ring finger   • MEDIPORT INSERTION, SINGLE         Family History   Problem Relation Age of Onset   • Colon cancer Neg Hx    • Colon polyps Neg Hx        Social History     Social History   • Marital status: Single     Spouse name: N/A   • Number of children: N/A   • Years of education: N/A     Social History Main Topics   • Smoking status: Former Smoker     Packs/day: 2.00     Years: 20.00     Types: Cigarettes   • Smokeless tobacco: Never Used   • Alcohol use No   • Drug use: No   • Sexual activity: Defer     Other Topics Concern   • None     Social History Narrative           Objective   Physical Exam   Constitutional: He is oriented to person, place, and time. He appears well-developed and well-nourished. No distress.   HENT:   Head: Normocephalic and atraumatic.   Eyes: EOM are normal. Pupils are equal, round, and reactive to light.   Cardiovascular: Normal rate, regular rhythm and normal heart sounds.  Exam reveals no friction rub.    No murmur heard.  Pulmonary/Chest: Effort normal and breath sounds normal. No respiratory distress. He has no wheezes. He has no rales. He exhibits tenderness.   Pain reproducible with palpation to left chest   Abdominal: Soft. Bowel sounds are normal. He exhibits no distension. There is no tenderness.   Musculoskeletal: Normal range of motion. He exhibits no edema or deformity.   Neurological: He is alert and oriented to person, place, and time.   Skin: Skin is " warm. He is not diaphoretic. No erythema. No pallor.   Psychiatric: He has a normal mood and affect. His behavior is normal.   Nursing note and vitals reviewed.      Procedures         ED Course  ED Course   Comment By Time   Patient states that pain has improved some - from 6-7 to about 2/10.  S;eeping upon entering room.  Pain still reproducable to palpation.  Toradol to be ordered, but patient has listed allergy to Naproxen.  He states that he is actually not allergic to Naproxen and does not know how that was added to his list.  Disucssed lab and CXR results with patient.  Discussed possibility of PE with his risk factors despite negative D-dimer.  Patient would like to proceed with CT to rule out PE.  Will re-evaluate after Toradol and CTA chest Aquilino Watson PA-C 01/10 1248   Discussed results with patient.  Offered stress for further evaluation of pain relieved by nitro.  Patient would like stress.  Order placed Aquilino Watson PA-C 01/10 1444   Discussed results with patient.  He states that his pain is resolved.  Feeling much better.  Will discharge home.  Patient has appt with his PCP previously scheduled for tomorrow. Aquilino Watson PA-C 01/10 1801                HEART Score (for prediction of 6-week risk of major adverse cardiac event) reviewed and/or performed as part of the patient evaluation and treatment planning process.  The result associated with this review/performance is: 3       MDM    Final diagnoses:   Acute costochondritis            Aquilino Watson PA-C  01/10/18 [id number]

## 2018-02-02 ENCOUNTER — TRANSCRIBE ORDERS (OUTPATIENT)
Dept: ADMINISTRATIVE | Facility: HOSPITAL | Age: 58
End: 2018-02-02

## 2018-02-02 DIAGNOSIS — C34.11 MALIGNANT NEOPLASM OF UPPER LOBE OF RIGHT LUNG (HCC): Primary | ICD-10-CM

## 2018-05-11 ENCOUNTER — LAB (OUTPATIENT)
Dept: LAB | Facility: HOSPITAL | Age: 58
End: 2018-05-11
Attending: INTERNAL MEDICINE

## 2018-05-11 ENCOUNTER — HOSPITAL ENCOUNTER (OUTPATIENT)
Dept: CT IMAGING | Facility: HOSPITAL | Age: 58
Discharge: HOME | End: 2018-05-11
Attending: INTERNAL MEDICINE | Admitting: INTERNAL MEDICINE

## 2018-05-11 ENCOUNTER — TRANSCRIBE ORDERS (OUTPATIENT)
Dept: ADMINISTRATIVE | Facility: HOSPITAL | Age: 58
End: 2018-05-11

## 2018-05-11 DIAGNOSIS — C34.11 SQUAMOUS CELL CARCINOMA OF BRONCHUS IN RIGHT UPPER LOBE (HCC): Primary | ICD-10-CM

## 2018-05-11 DIAGNOSIS — C34.11 SQUAMOUS CELL CARCINOMA OF BRONCHUS IN RIGHT UPPER LOBE (HCC): ICD-10-CM

## 2018-05-11 DIAGNOSIS — C34.11 MALIGNANT NEOPLASM OF UPPER LOBE OF RIGHT LUNG (HCC): ICD-10-CM

## 2018-05-11 LAB
ALBUMIN SERPL-MCNC: 3.8 G/DL (ref 3.5–5)
ALBUMIN/GLOB SERPL: 1.2 G/DL (ref 1.1–2.5)
ALP SERPL-CCNC: 82 U/L (ref 24–120)
ALT SERPL W P-5'-P-CCNC: 36 U/L (ref 0–54)
ANION GAP SERPL CALCULATED.3IONS-SCNC: 10 MMOL/L (ref 4–13)
AST SERPL-CCNC: 76 U/L (ref 7–45)
BASOPHILS # BLD AUTO: 0.05 10*3/MM3 (ref 0–0.2)
BASOPHILS NFR BLD AUTO: 0.6 % (ref 0–2)
BILIRUB SERPL-MCNC: 0.3 MG/DL (ref 0.1–1)
BUN BLD-MCNC: 18 MG/DL (ref 5–21)
BUN/CREAT SERPL: 19.8 (ref 7–25)
CALCIUM SPEC-SCNC: 9.1 MG/DL (ref 8.4–10.4)
CEA SERPL-MCNC: 1.75 NG/ML (ref 0–5)
CHLORIDE SERPL-SCNC: 102 MMOL/L (ref 98–110)
CO2 SERPL-SCNC: 28 MMOL/L (ref 24–31)
CREAT BLD-MCNC: 0.91 MG/DL (ref 0.5–1.4)
CREAT BLDA-MCNC: 1 MG/DL (ref 0.6–1.3)
DEPRECATED RDW RBC AUTO: 40.9 FL (ref 40–54)
EOSINOPHIL # BLD AUTO: 0.3 10*3/MM3 (ref 0–0.7)
EOSINOPHIL NFR BLD AUTO: 3.6 % (ref 0–4)
ERYTHROCYTE [DISTWIDTH] IN BLOOD BY AUTOMATED COUNT: 12.5 % (ref 12–15)
GFR SERPL CREATININE-BSD FRML MDRD: 86 ML/MIN/1.73
GLOBULIN UR ELPH-MCNC: 3.3 GM/DL
GLUCOSE BLD-MCNC: 97 MG/DL (ref 70–100)
HCT VFR BLD AUTO: 38.2 % (ref 40–52)
HGB BLD-MCNC: 12.8 G/DL (ref 14–18)
IMM GRANULOCYTES # BLD: 0.09 10*3/MM3 (ref 0–0.03)
IMM GRANULOCYTES NFR BLD: 1.1 % (ref 0–5)
LYMPHOCYTES # BLD AUTO: 0.83 10*3/MM3 (ref 0.72–4.86)
LYMPHOCYTES NFR BLD AUTO: 10.1 % (ref 15–45)
MCH RBC QN AUTO: 29.7 PG (ref 28–32)
MCHC RBC AUTO-ENTMCNC: 33.5 G/DL (ref 33–36)
MCV RBC AUTO: 88.6 FL (ref 82–95)
MONOCYTES # BLD AUTO: 0.54 10*3/MM3 (ref 0.19–1.3)
MONOCYTES NFR BLD AUTO: 6.6 % (ref 4–12)
NEUTROPHILS # BLD AUTO: 6.43 10*3/MM3 (ref 1.87–8.4)
NEUTROPHILS NFR BLD AUTO: 78 % (ref 39–78)
NRBC BLD MANUAL-RTO: 0 /100 WBC (ref 0–0)
PLATELET # BLD AUTO: 229 10*3/MM3 (ref 130–400)
PMV BLD AUTO: 10.3 FL (ref 6–12)
POTASSIUM BLD-SCNC: 4.2 MMOL/L (ref 3.5–5.3)
PROT SERPL-MCNC: 7.1 G/DL (ref 6.3–8.7)
RBC # BLD AUTO: 4.31 10*6/MM3 (ref 4.8–5.9)
SODIUM BLD-SCNC: 140 MMOL/L (ref 135–145)
WBC NRBC COR # BLD: 8.24 10*3/MM3 (ref 4.8–10.8)

## 2018-05-11 PROCEDURE — 82565 ASSAY OF CREATININE: CPT

## 2018-05-11 PROCEDURE — 25010000002 IOPAMIDOL 61 % SOLUTION: Performed by: INTERNAL MEDICINE

## 2018-05-11 PROCEDURE — 71260 CT THORAX DX C+: CPT

## 2018-05-11 PROCEDURE — 36415 COLL VENOUS BLD VENIPUNCTURE: CPT

## 2018-05-11 PROCEDURE — 82378 CARCINOEMBRYONIC ANTIGEN: CPT | Performed by: INTERNAL MEDICINE

## 2018-05-11 PROCEDURE — 80053 COMPREHEN METABOLIC PANEL: CPT | Performed by: INTERNAL MEDICINE

## 2018-05-11 PROCEDURE — 85025 COMPLETE CBC W/AUTO DIFF WBC: CPT | Performed by: INTERNAL MEDICINE

## 2018-05-11 RX ADMIN — IOPAMIDOL 100 ML: 612 INJECTION, SOLUTION INTRAVENOUS at 10:15

## 2018-05-17 ENCOUNTER — TRANSCRIBE ORDERS (OUTPATIENT)
Dept: ADMINISTRATIVE | Facility: HOSPITAL | Age: 58
End: 2018-05-17

## 2018-05-17 ENCOUNTER — APPOINTMENT (OUTPATIENT)
Dept: LAB | Facility: HOSPITAL | Age: 58
End: 2018-05-17
Attending: INTERNAL MEDICINE

## 2018-05-17 DIAGNOSIS — C34.90 SQUAMOUS CELL CARCINOMA OF LUNG, UNSPECIFIED LATERALITY (HCC): Primary | ICD-10-CM

## 2018-05-17 LAB
FERRITIN SERPL-MCNC: 110 NG/ML (ref 17.9–464)
FOLATE SERPL-MCNC: 10 NG/ML
IRON 24H UR-MRATE: 84 MCG/DL (ref 42–180)
IRON SATN MFR SERPL: 32 % (ref 20–45)
TIBC SERPL-MCNC: 263 MCG/DL (ref 225–420)
VIT B12 BLD-MCNC: 296 PG/ML (ref 239–931)

## 2018-05-17 PROCEDURE — 83540 ASSAY OF IRON: CPT | Performed by: INTERNAL MEDICINE

## 2018-05-17 PROCEDURE — 83550 IRON BINDING TEST: CPT | Performed by: INTERNAL MEDICINE

## 2018-05-17 PROCEDURE — 82746 ASSAY OF FOLIC ACID SERUM: CPT | Performed by: INTERNAL MEDICINE

## 2018-05-17 PROCEDURE — 82728 ASSAY OF FERRITIN: CPT | Performed by: INTERNAL MEDICINE

## 2018-05-17 PROCEDURE — 36415 COLL VENOUS BLD VENIPUNCTURE: CPT

## 2018-05-17 PROCEDURE — 82607 VITAMIN B-12: CPT | Performed by: INTERNAL MEDICINE

## 2018-05-25 ENCOUNTER — TRANSCRIBE ORDERS (OUTPATIENT)
Dept: ADMINISTRATIVE | Facility: HOSPITAL | Age: 58
End: 2018-05-25

## 2018-05-25 DIAGNOSIS — C34.11 MALIGNANT NEOPLASM OF UPPER LOBE OF RIGHT LUNG (HCC): Primary | ICD-10-CM

## 2018-06-01 ENCOUNTER — HOSPITAL ENCOUNTER (OUTPATIENT)
Dept: HOSPITAL 58 - RAD | Age: 58
Discharge: HOME | End: 2018-06-01
Attending: FAMILY MEDICINE

## 2018-06-01 VITALS — BODY MASS INDEX: 29 KG/M2

## 2018-06-01 DIAGNOSIS — Z85.118: ICD-10-CM

## 2018-06-01 DIAGNOSIS — R06.02: Primary | ICD-10-CM

## 2018-06-01 NOTE — DI
EXAM:  Two views of the chest. 

  

History:  Lung cancer, recent pneumonia 

  

Comparison:  Chest radiograph 01/05/2016 

  

Findings:  Heart size is within normal limits.  No change in the right perihilar scarring.  No develo
ping opacities.  No appreciable pleural fluid and no pneumothorax.  No acute osseous abnormalities.  
Degenerative changes of the spine. 

  

Impression:  No change in the right perihilar scarring.  No developing lung opacities.

## 2018-06-22 ENCOUNTER — HOSPITAL ENCOUNTER (OUTPATIENT)
Dept: HOSPITAL 58 - LAB | Age: 58
Discharge: HOME | End: 2018-06-22
Attending: FAMILY MEDICINE

## 2018-06-22 VITALS — BODY MASS INDEX: 29 KG/M2

## 2018-06-22 DIAGNOSIS — Z85.118: ICD-10-CM

## 2018-06-22 DIAGNOSIS — M51.36: Primary | ICD-10-CM

## 2018-06-22 DIAGNOSIS — Z79.899: ICD-10-CM

## 2018-06-22 DIAGNOSIS — Z86.59: ICD-10-CM

## 2018-06-22 PROCEDURE — 36415 COLL VENOUS BLD VENIPUNCTURE: CPT

## 2018-06-22 PROCEDURE — 80061 LIPID PANEL: CPT

## 2018-06-22 PROCEDURE — 80053 COMPREHEN METABOLIC PANEL: CPT

## 2018-06-22 PROCEDURE — 81001 URINALYSIS AUTO W/SCOPE: CPT

## 2018-06-22 PROCEDURE — 85025 COMPLETE CBC W/AUTO DIFF WBC: CPT

## 2018-06-22 NOTE — PROGRESS NOTES
[facility]MD April Mendieta, RANJITHN, PA-C  ____________________________________________________________  [facility]  Department of Radiation Oncology  [address] [place] [id number]  Office:  [phone]  Fax: [phone]    DATE:   06/28/2018    PATIENT:   Familia Michael   1960                                 MEDICAL RECORD #:  [id number]    Reason for Visit: Squamous cell carcinoma of right lung [C34.91]    BRIEF HISTORY:  Familia Michael is a very pleasant 57 y.o. 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 He returns to the clinic today for routine follow up exam.  is doing well with no new significant treatment or disease related complaints. He continues to follow with Dr Schneider, last appointment was 05/25/2018 and next appointment is July 20th, 2018.  He denies knowledge of nodules or masses.  He denies new or unexplained bone pain.  He denies headaches, nausea, dizziness, visual changes, or focal neurological deficits.    05/29/2014 - Chest x-ray:  • 1.3cm cavitary right upper lobe nodule    05/29/2014 - CT Chest:  • There are 2 opacities on the right which are nonspecific. Comparison with any outside studies is recommended.  • In their absence, a PET scan might be considered. Percutaneous biopsy would not be recommended.    05/31/2014 - Biopsy, tracheal lesion:  • Moderately differentiated squamous cell carcinoma.    07/18/2014 - MRI Brain:  • No evidence of intracranial metastasis.  • Nonspecific nonenhancing hyperintense FLAIR signal changes within the frontal lobe white matter. These could reflect early chronic small vessel ischemia. Migraine headaches considered. Demyelinating process thought to be much less likely.  • Chronic mucosal thickening of the paranasal sinuses.    07/18/2014 - PET Scan:  • There is a  hypermetabolic subcarinal lymph node which creates mass effect on the right mainstem bronchus. A malignant lymph node, likely metastatic node, is favored.  • There is a 1.0 cm oval nodule within the right upper lobe that does demonstrate FDG uptake with an SUV of 2.5. A malignant nodule must be considered. Primary versus metastatic lesion considered. Given the oval configuration of the nodule, I believe a percutaneous biopsy would be extremely difficult.  • Hypermetabolic brown fat activity within the lower neck, axilla, and mediastinum. No pathologic lymph node seen within the neck.    08/07/2014 - 09/25/2014 - Completed Radiation course:  • Received 7000 cGy in 35 fractions to right lung via external beam radiation therapy.    09/10/2014 - 11/10/2014 - Chemotherapy course:  • Weekly Taxotere/Carboplatin x 9 doses    11/12/2014 - CT Chest:  • No significant interval change in a 10 mm spiculated nodule seen in the lateral aspect of the right upper lobe at the level of the richard. Interval decrease in size of adenopathy posterior to the right mainstem bronchus when compared to PET/CT of 07/18/2014. 3 mm noncalcified left upper lobe pulmonary nodule which should be followed on subsequent exams.    11/12/2014 - CT Abdomen/Pelvis:  • Unremarkable CT of the abdomen and pelvis.    12/08/2014 - 02/09/2015 - Chemotherapy course:  • Consolidation Taxotere/Carboplatin x 4 cycles    02/18/2015 - PET Scan:  • Areas of infiltrate in both upper lobes right greater than left with metabolic activity are most likely infectious or inflammatory. Neoplasm is felt to be less likely.  • Previously seen lymph node uptake in the subcarinal region is now resolved.  • There is no distant metastatic disease seen.  • There is brown fat uptake in the chest and neck    05/15/2015 - PET Scan:  • Mild infiltrative changes in the upper lobes appear to be improvingcompared to prior PET exam of 02/18/2015. I suspect these represent an infectious or  inflammatory process rather than malignancy. The SUV inthese areas appears to be decreasing as well.  • Physiologic uptake in muscle and brown fat. No suspicious hypermetabolic nodules or adenopathy are identified.    08/05/2015 - PET Scan:  • The upper lobe infiltrates are essentially unchanged with slight decrease in the amount of radiotracer uptake in the right upper lobe infiltrate when compared to 08/05/2015. No new sites of uptake identified    11/30/2015 - CT Chest:  • There is scarring with volume loss within the right upper lobe as well as some scarring within the left upper lobe. This is stable to slightly improved from the previous CT of 08/05/2015 within the right upper lobe. I do not see any radiographic evidence of recurrence with no new lymphadenopathy or mass lesion. No destructive bony lesions are present.  • Both adrenal glands are rather prominent in size. There is an associated hypodense nodule associated with the left adrenal gland unchanged from the previous CT of 08/05/2015. This likely represents an adenoma.    03/28/2016 - CT Chest:  • Posttreatment changes in the upper lobes without evidence of recurrent or metastatic disease.  • Persistent prominence of the LEFT adrenal gland.    08/15/2016 - CT Chest:  • Posttreatment changes of the upper lobes are stable to slightly improved. No new lesions or recurrent lesions identified that would suggest metastatic disease.  • No discrete bone pathology.  • Stable prominence of the left adrenal gland.    01/19/2017 - CT Chest:  • Tiny 3 mm left upper lobe nodule close to the area of radiation fibrosis in the left upper lobe is stable compared to the recent study and is smaller in size compared to 11/12/2014 where it measured 4.5 mm.  • Bilateral upper lobe post radiation change right greater than left.  • There are no new pulmonary nodules.  • Stable low density left adrenal nodule, likely an adrenal adenoma. Fatty liver.  • Coronary artery  calcification.    06/29/2017 - Appointment with :  • Return to Dr. Bennett in one year   • Follow with Dr. Schneider as scheduled    07/06/2017 - CT Chest:  • Stable appearance of the chest from previous study of 01/19/2017. No radiographic evidence of recurrence or metastatic disease.    12/21/2017 - CT Chest:  • Stable appearance of the chest with parenchymal scarring in the right upper lobe and lingula which appears stable.  • Coronary artery disease.  • Stable left adrenal nodule suggests benign etiology.    01/10/2018 Chest x--ray:  • Opacity in the right suprahilar region likely representing an area of scarring related to previous lung cancer treatment.  • No new infiltrate or effusion.    01/10/2018 - CT Angio Chest:  • No evidence of pulmonary embolus or other acute cardiopulmonary process.    05/11/2018 - CT Chest:  • Progressive fibrosis in the right suprahilar region with increased bronchial wall thickening but no measurable mass or evidence of adenopathy. No pulmonary nodules are identified.  • Mild interstitial infiltrate in the left lower lobe without consolidation. A small area of interstitial pneumonia is not excluded.  • Low attenuation thickening and nodularity of the left adrenal gland appear stable, most likely related to adrenal adenomas.  • Heavy calcified plaque is demonstrated within the LAD coronary artery distribution.    05/25/2018 - Appointment with :  • Scheduled CT scan of the chest in 7 weeks, placed on antibiotics for pneumonia in previous CT  • Follow up in 8 weeks     07/13/2018 Follow up CT scheduled    07/20/2018 Next appt with Dr. Schneider    History obtained from  PATIENT, FAMILY and CHART    PAST MEDICAL HISTORY   Past Medical History:   Diagnosis Date   • Arthritis    • Asthma    • Cancer     lung   • COPD (chronic obstructive pulmonary disease)    • Heart murmur    • Hypertension    • Lung cancer       PAST SURGICAL HISTORY   Past Surgical History:    Procedure Laterality Date   • BRONCHOSCOPY     • COLONOSCOPY  02/20/2017    normal exam repeat 10 years.   • FINGER SURGERY      Reattachment of left ring finger   • MEDIPORT INSERTION, SINGLE        SOCIAL HISTORY   Social History   Substance Use Topics   • Smoking status: Former Smoker     Packs/day: 2.00     Years: 20.00     Types: Cigarettes   • Smokeless tobacco: Never Used   • Alcohol use No      ALLERGIES  Sulfa antibiotics; Naproxen; and Tylenol with codeine #3 [acetaminophen-codeine]     MEDICATIONS   Current Outpatient Prescriptions   Medication Sig Dispense Refill   • albuterol (PROVENTIL HFA;VENTOLIN HFA) 108 (90 BASE) MCG/ACT inhaler Inhale 2 puffs Every 4 (Four) Hours As Needed for Wheezing or Shortness of Air.     • budesonide-formoterol (SYMBICORT) 160-4.5 MCG/ACT inhaler Inhale 2 puffs 2 (Two) Times a Day.     • FLUoxetine (PROzac) 20 MG capsule Take 20 mg by mouth Daily.     • folic acid (FOLVITE) 1 MG tablet Take 1 mg by mouth Daily.     • lisinopril (PRINIVIL,ZESTRIL) 5 MG tablet Take 5 mg by mouth Daily.     • raNITIdine (ZANTAC) 150 MG tablet Take 150 mg by mouth 2 (Two) Times a Day.     • traMADol (ULTRAM) 50 MG tablet Take 100 mg by mouth 2 (Two) Times a Day As Needed for Moderate Pain .       No current facility-administered medications for this visit.       The following portions of the patient's history were reviewed and updated as appropriate: allergies, current medications, past family history, past medical history, past social history, past surgical history and problem list.    REVIEW OF SYSTEMS  Review of Systems   Constitutional: Negative for appetite change, fatigue and unexpected weight change.   HENT: Negative.    Eyes:        Glasses     Respiratory: Positive for shortness of breath (with activity; stable for patient). Negative for cough and chest tightness.    Cardiovascular: Negative for leg swelling.   Gastrointestinal: Negative.    Endocrine: Negative.    Genitourinary:  "Negative.    Musculoskeletal: Negative.    Skin: Negative.    Allergic/Immunologic: Negative.    Neurological: Positive for dizziness (when standing too quickly). Negative for numbness and headaches.   Hematological: Negative.    Psychiatric/Behavioral: Negative.        PHYSICAL EXAM  VITAL SIGNS:   Vitals:    06/28/18 1340   BP: 148/88   Weight: 84.4 kg (186 lb)   Height: 167.6 cm (66\")   PainSc: 0-No pain     General: Alert, cooperative, no acute distress, appears stated age. Vitals reviewed.  Head/Neck:  Normocephalic, without obvious abnormality, atraumatic.  Oropharynx is clear, mouth and throat are clear. The trachea is midline. Neck is supple without evidence of cervical adenopathy.   Eyes: The pupils are equal, round, and reactive, no scleral jaundice or erythema.    Ears: Ears appear intact with no abnormalities noted, hearing grossly normal  Chest:  Respiratory efforts are normal and unlabored, chest is clear to auscultation. There are no wheezes, rhonchi, rales, or asymmetry of breath sounds.  Cardiovascular: Regular rate and rhythm without murmurs, rubs, or gallops.   Abdomen:  Soft and non-tender- Normal bowel sounds noted. No masses noted   Extremities:  ÁLVAREZ well, no evidence of cyanosis, clubbing, or edema.  Skin: No bleeding, bruising or rash noted  Neurologic:  alert, oriented. Non- Focal Exam  Psych: Mood and affect are appropriate for circumstance. Eye contact is appropriate.      Performance Status: ECOG (0) Fully active, able to carry on all predisease performance without restriction    Clinical Quality Measures  -Pain Documented  Pain severity quantified; no pain present, no followup plan required.0   -Advanced Care Planning Care plan discussed, no care plan provided  -Body Mass Index Screening and Follow-Up Plan Body mass index is 30.02 kg/m². Patient's Body mass index is 30.02 kg/m². BMI is above normal parameters. Recommendations include: educational material.  -Pneumonia Vaccine: Received " Injection?  NO- I am recommending patient to see PCP for annual wellness exam   -Tobacco Use: Screening and Cessation Intervention Smoking status: Former Smoker                                                              Packs/day: 2.00      Years: 20.00        Types: Cigarettes  Smokeless tobacco: Never Used                        ASSESSMENT AND PLAN   1. Squamous cell carcinoma of right lung    2. Hx of radiation therapy    3. Nonsmoker    4. Encounter for follow-up surveillance of lung cancer      RECOMMENDATIONS:  Familia Michael is status post completion of radiation therapy for Stage IIIA (TIa,N0,M0) squamous cell carcinoma of the right lung and presents to our clinic today for follow up.  CT scan in May revealed a slight pnuemonia in the contralateral lung which was treate by Dr. Schneider, repeat CT scan is planned for July 13th with a follow up with him on the 20th. Mr. Michael is doing very well, he is without symptoms or evidence for recurrent or metastatic disease at this time. Will continue routine follow-up.    Patient Instructions   1. Return to Dr. Bennett in one year  2. Continue to follow with       Todays appointment time was spent in counseling and coordination of care as follows: diagnosis, intent of treatment discussing radiation therapy specifics: logistics, possible and probable side effects and after effects, staging of cancer, standard of care in for this stage of this cancer and treatment options.  Visit lasted approximately 15 minutes, the majority of which was spent in direct face-to-face patient counseling and coordination.  I saw this patient while covering for Dr. Moses Bennett, radiation oncologist.  Konrad Kapadia MD  06/28/2018

## 2018-06-28 ENCOUNTER — OFFICE VISIT (OUTPATIENT)
Dept: RADIATION ONCOLOGY | Facility: HOSPITAL | Age: 58
End: 2018-06-28

## 2018-06-28 ENCOUNTER — HOSPITAL ENCOUNTER (OUTPATIENT)
Dept: RADIATION ONCOLOGY | Facility: HOSPITAL | Age: 58
Setting detail: RADIATION/ONCOLOGY SERIES
End: 2018-06-28

## 2018-06-28 VITALS
WEIGHT: 186 LBS | BODY MASS INDEX: 29.89 KG/M2 | SYSTOLIC BLOOD PRESSURE: 148 MMHG | HEIGHT: 66 IN | DIASTOLIC BLOOD PRESSURE: 88 MMHG

## 2018-06-28 DIAGNOSIS — C34.91 SQUAMOUS CELL CARCINOMA OF RIGHT LUNG (HCC): Primary | ICD-10-CM

## 2018-06-28 DIAGNOSIS — Z78.9 NONSMOKER: ICD-10-CM

## 2018-06-28 DIAGNOSIS — Z85.118 ENCOUNTER FOR FOLLOW-UP SURVEILLANCE OF LUNG CANCER: ICD-10-CM

## 2018-06-28 DIAGNOSIS — Z92.3 HX OF RADIATION THERAPY: ICD-10-CM

## 2018-06-28 DIAGNOSIS — Z08 ENCOUNTER FOR FOLLOW-UP SURVEILLANCE OF LUNG CANCER: ICD-10-CM

## 2018-06-28 PROCEDURE — G0463 HOSPITAL OUTPT CLINIC VISIT: HCPCS | Performed by: RADIOLOGY

## 2018-06-28 RX ORDER — RANITIDINE 150 MG/1
150 TABLET ORAL 2 TIMES DAILY
COMMUNITY

## 2018-06-28 RX ORDER — FOLIC ACID 1 MG/1
1 TABLET ORAL DAILY
COMMUNITY

## 2018-07-13 ENCOUNTER — HOSPITAL ENCOUNTER (OUTPATIENT)
Dept: CT IMAGING | Facility: HOSPITAL | Age: 58
Discharge: HOME | End: 2018-07-13
Attending: INTERNAL MEDICINE | Admitting: INTERNAL MEDICINE

## 2018-07-13 ENCOUNTER — TRANSCRIBE ORDERS (OUTPATIENT)
Dept: ADMINISTRATIVE | Facility: HOSPITAL | Age: 58
End: 2018-07-13

## 2018-07-13 ENCOUNTER — APPOINTMENT (OUTPATIENT)
Dept: LAB | Facility: HOSPITAL | Age: 58
End: 2018-07-13
Attending: INTERNAL MEDICINE

## 2018-07-13 DIAGNOSIS — C34.11 SQUAMOUS CELL CARCINOMA OF BRONCHUS IN RIGHT UPPER LOBE (HCC): Primary | ICD-10-CM

## 2018-07-13 DIAGNOSIS — C34.11 MALIGNANT NEOPLASM OF UPPER LOBE OF RIGHT LUNG (HCC): ICD-10-CM

## 2018-07-13 LAB
ALBUMIN SERPL-MCNC: 4.6 G/DL (ref 3.5–5)
ALBUMIN/GLOB SERPL: 1.5 G/DL (ref 1.1–2.5)
ALP SERPL-CCNC: 71 U/L (ref 24–120)
ALT SERPL W P-5'-P-CCNC: 49 U/L (ref 0–54)
ANION GAP SERPL CALCULATED.3IONS-SCNC: 16 MMOL/L (ref 4–13)
AST SERPL-CCNC: 33 U/L (ref 7–45)
BASOPHILS # BLD AUTO: 0.06 10*3/MM3 (ref 0–0.2)
BASOPHILS NFR BLD AUTO: 0.9 % (ref 0–2)
BILIRUB SERPL-MCNC: 0.4 MG/DL (ref 0.1–1)
BUN BLD-MCNC: 13 MG/DL (ref 5–21)
BUN/CREAT SERPL: 14.1 (ref 7–25)
CALCIUM SPEC-SCNC: 10 MG/DL (ref 8.4–10.4)
CEA SERPL-MCNC: 1.58 NG/ML (ref 0–5)
CHLORIDE SERPL-SCNC: 100 MMOL/L (ref 98–110)
CO2 SERPL-SCNC: 28 MMOL/L (ref 24–31)
CREAT BLD-MCNC: 0.92 MG/DL (ref 0.5–1.4)
CREAT BLDA-MCNC: 0.9 MG/DL (ref 0.6–1.3)
DEPRECATED RDW RBC AUTO: 41.8 FL (ref 40–54)
EOSINOPHIL # BLD AUTO: 0.19 10*3/MM3 (ref 0–0.7)
EOSINOPHIL NFR BLD AUTO: 2.8 % (ref 0–4)
ERYTHROCYTE [DISTWIDTH] IN BLOOD BY AUTOMATED COUNT: 13.2 % (ref 12–15)
FERRITIN SERPL-MCNC: 107 NG/ML (ref 17.9–464)
FOLATE SERPL-MCNC: >20 NG/ML
GFR SERPL CREATININE-BSD FRML MDRD: 84 ML/MIN/1.73
GLOBULIN UR ELPH-MCNC: 3.1 GM/DL
GLUCOSE BLD-MCNC: 106 MG/DL (ref 70–100)
HCT VFR BLD AUTO: 44.5 % (ref 40–52)
HGB BLD-MCNC: 15 G/DL (ref 14–18)
IMM GRANULOCYTES # BLD: 0.04 10*3/MM3 (ref 0–0.03)
IMM GRANULOCYTES NFR BLD: 0.6 % (ref 0–5)
IRON 24H UR-MRATE: 94 MCG/DL (ref 42–180)
IRON SATN MFR SERPL: 35 % (ref 20–45)
LYMPHOCYTES # BLD AUTO: 1 10*3/MM3 (ref 0.72–4.86)
LYMPHOCYTES NFR BLD AUTO: 14.5 % (ref 15–45)
MCH RBC QN AUTO: 29.2 PG (ref 28–32)
MCHC RBC AUTO-ENTMCNC: 33.7 G/DL (ref 33–36)
MCV RBC AUTO: 86.7 FL (ref 82–95)
MONOCYTES # BLD AUTO: 0.51 10*3/MM3 (ref 0.19–1.3)
MONOCYTES NFR BLD AUTO: 7.4 % (ref 4–12)
NEUTROPHILS # BLD AUTO: 5.09 10*3/MM3 (ref 1.87–8.4)
NEUTROPHILS NFR BLD AUTO: 73.8 % (ref 39–78)
NRBC BLD MANUAL-RTO: 0 /100 WBC (ref 0–0)
PLATELET # BLD AUTO: 186 10*3/MM3 (ref 130–400)
PMV BLD AUTO: 10.9 FL (ref 6–12)
POTASSIUM BLD-SCNC: 3.5 MMOL/L (ref 3.5–5.3)
PROT SERPL-MCNC: 7.7 G/DL (ref 6.3–8.7)
RBC # BLD AUTO: 5.13 10*6/MM3 (ref 4.8–5.9)
SODIUM BLD-SCNC: 144 MMOL/L (ref 135–145)
TIBC SERPL-MCNC: 269 MCG/DL (ref 225–420)
VIT B12 BLD-MCNC: 700 PG/ML (ref 239–931)
WBC NRBC COR # BLD: 6.89 10*3/MM3 (ref 4.8–10.8)

## 2018-07-13 PROCEDURE — 82607 VITAMIN B-12: CPT | Performed by: INTERNAL MEDICINE

## 2018-07-13 PROCEDURE — 82728 ASSAY OF FERRITIN: CPT | Performed by: INTERNAL MEDICINE

## 2018-07-13 PROCEDURE — 36415 COLL VENOUS BLD VENIPUNCTURE: CPT

## 2018-07-13 PROCEDURE — 80053 COMPREHEN METABOLIC PANEL: CPT | Performed by: INTERNAL MEDICINE

## 2018-07-13 PROCEDURE — 82378 CARCINOEMBRYONIC ANTIGEN: CPT | Performed by: INTERNAL MEDICINE

## 2018-07-13 PROCEDURE — 83550 IRON BINDING TEST: CPT | Performed by: INTERNAL MEDICINE

## 2018-07-13 PROCEDURE — 25010000002 IOPAMIDOL 61 % SOLUTION: Performed by: INTERNAL MEDICINE

## 2018-07-13 PROCEDURE — 82565 ASSAY OF CREATININE: CPT

## 2018-07-13 PROCEDURE — 71260 CT THORAX DX C+: CPT

## 2018-07-13 PROCEDURE — 85025 COMPLETE CBC W/AUTO DIFF WBC: CPT | Performed by: INTERNAL MEDICINE

## 2018-07-13 PROCEDURE — 83540 ASSAY OF IRON: CPT | Performed by: INTERNAL MEDICINE

## 2018-07-13 PROCEDURE — 82746 ASSAY OF FOLIC ACID SERUM: CPT | Performed by: INTERNAL MEDICINE

## 2018-07-13 RX ADMIN — IOPAMIDOL 100 ML: 612 INJECTION, SOLUTION INTRAVENOUS at 10:45

## 2018-07-23 ENCOUNTER — TRANSCRIBE ORDERS (OUTPATIENT)
Dept: ADMINISTRATIVE | Facility: HOSPITAL | Age: 58
End: 2018-07-23

## 2018-07-23 DIAGNOSIS — C34.11 MALIGNANT NEOPLASM OF UPPER LOBE OF RIGHT LUNG (HCC): Primary | ICD-10-CM

## 2018-09-11 ENCOUNTER — HOSPITAL ENCOUNTER (OUTPATIENT)
Dept: HOSPITAL 58 - RAD | Age: 58
Discharge: HOME | End: 2018-09-11
Attending: FAMILY MEDICINE

## 2018-09-11 VITALS — BODY MASS INDEX: 29 KG/M2

## 2018-09-11 DIAGNOSIS — M54.2: Primary | ICD-10-CM

## 2018-09-11 NOTE — DI
Exam:  Cervical spine three-view. 

  

HISTORY:  Neck pain. 

  

Findings:  Three images of the cervical spine are submitted.  These demonstrate moderate degenerative
 disc and facet arthropathy with no compression fracture or listhesis.  The odontoid process appears 
intact and normally centered.  The prevertebral soft tissues are within normal limits. 

  

Impressions:  Multilevel moderate degenerative disease with no compression fracture or listhesis in t
he cervical spine.

## 2018-11-01 ENCOUNTER — APPOINTMENT (OUTPATIENT)
Dept: CT IMAGING | Facility: HOSPITAL | Age: 58
End: 2018-11-01
Attending: INTERNAL MEDICINE

## 2018-11-01 ENCOUNTER — HOSPITAL ENCOUNTER (OUTPATIENT)
Dept: HOSPITAL 58 - LAB | Age: 58
Discharge: HOME | End: 2018-11-01
Attending: INTERNAL MEDICINE

## 2018-11-01 VITALS — BODY MASS INDEX: 29 KG/M2

## 2018-11-01 DIAGNOSIS — C34.11: Primary | ICD-10-CM

## 2018-11-01 PROCEDURE — 36415 COLL VENOUS BLD VENIPUNCTURE: CPT

## 2018-11-01 PROCEDURE — 82378 CARCINOEMBRYONIC ANTIGEN: CPT

## 2018-11-01 PROCEDURE — 83540 ASSAY OF IRON: CPT

## 2018-11-01 PROCEDURE — 80053 COMPREHEN METABOLIC PANEL: CPT

## 2018-11-01 PROCEDURE — 82607 VITAMIN B-12: CPT

## 2018-11-01 PROCEDURE — 82746 ASSAY OF FOLIC ACID SERUM: CPT

## 2018-11-01 PROCEDURE — 83550 IRON BINDING TEST: CPT

## 2018-11-01 PROCEDURE — 82728 ASSAY OF FERRITIN: CPT

## 2018-11-01 PROCEDURE — 85025 COMPLETE CBC W/AUTO DIFF WBC: CPT

## 2018-11-02 ENCOUNTER — HOSPITAL ENCOUNTER (OUTPATIENT)
Dept: HOSPITAL 58 - RAD | Age: 58
Discharge: HOME | End: 2018-11-02
Attending: INTERNAL MEDICINE

## 2018-11-02 VITALS — BODY MASS INDEX: 29 KG/M2

## 2018-11-02 DIAGNOSIS — C34.11: Primary | ICD-10-CM

## 2018-11-02 NOTE — CT
EXAM:  CT of the chest with contrast 

  

History:  Follow-up lung cancer. 

  

Comparison:  Chest CT 06/18/2016 

  

Technique:  Multiplanar CT images through the thorax were obtained following administration of IV con
trast. 

  

Findings:  Heart size is upper limits of normal.  Coronary calcifications.  No pericardial effusion. 
 Vessels are unremarkable.  No axillary lymphadenopathy.  No change in the right greater than left bi
lateral perihilar scarring.  No pathologically enlarged mediastinal or hilar lymph nodes.  No new kortney
g opacities or lung nodules.  No pleural fluid and no pneumothorax. 

  

Within the visualized upper abdomen, no acute findings.  No acute osseous abnormalities.  Degenerativ
e changes seen within the visualized lumbar spine. 

  

Impression: 

1.  No acute intrathoracic process. 

2.  No change in the chronic right greater than left bilateral perihilar scarring. 

3.  No CT evidence for recurrent, residual or metastatic disease in the thorax. 

4.  Coronary artery disease

## 2019-04-18 ENCOUNTER — HOSPITAL ENCOUNTER (OUTPATIENT)
Dept: HOSPITAL 58 - RAD | Age: 59
Discharge: HOME | End: 2019-04-18
Attending: INTERNAL MEDICINE

## 2019-04-18 VITALS — BODY MASS INDEX: 29 KG/M2

## 2019-04-18 DIAGNOSIS — C34.11: Primary | ICD-10-CM

## 2019-04-18 PROCEDURE — 80053 COMPREHEN METABOLIC PANEL: CPT

## 2019-04-18 PROCEDURE — 82607 VITAMIN B-12: CPT

## 2019-04-18 PROCEDURE — 82728 ASSAY OF FERRITIN: CPT

## 2019-04-18 PROCEDURE — 36415 COLL VENOUS BLD VENIPUNCTURE: CPT

## 2019-04-18 PROCEDURE — 83550 IRON BINDING TEST: CPT

## 2019-04-18 PROCEDURE — 85025 COMPLETE CBC W/AUTO DIFF WBC: CPT

## 2019-04-18 PROCEDURE — 82378 CARCINOEMBRYONIC ANTIGEN: CPT

## 2019-04-18 PROCEDURE — 82746 ASSAY OF FOLIC ACID SERUM: CPT

## 2019-04-18 PROCEDURE — 83540 ASSAY OF IRON: CPT

## 2019-04-18 NOTE — CT
EXAM:  CT chest with contrast 

  

HISTORY:  Squamous cell carcinoma of lung 

  

COMPARISON:  11/02/2018 

  

TECHNIQUE:  CT chest performed with intravenous contrast.  Coronal and sagittal reformatted images ob
tained. 

  

FINDINGS:  Thyroid and thoracic inlet appear normal.  Heart normal in size.  Coronary calcifications.
  No pericardial effusion.  Esophagus unremarkable.  Visualized portion upper abdomen demonstrates no
 acute abnormality.  Stable thickening of the left adrenal gland is similar to 2015, suggesting benig
n etiology.  No acute abnormalities of the bones.  No suspicious lytic or blastic lesions identified.
  Degenerative change in the spine.  No lymphadenopathy identified in the chest.  Central airway jaimes
nt.  Stable right greater than left bilateral perihilar scarring.  No pleural effusion or pneumothora
x.  No new or suspicious pulmonary nodules.  Granulomatous calcification. 

  

IMPRESSION: 

1.  Compared with 11/02/2018. 

2.  No CT evidence for recurrent, residual, or metastatic disease in the chest. 

3.  Stable right greater than left bilateral perihilar scarring. 

4.  Coronary calcifications.

## 2019-08-23 ENCOUNTER — HOSPITAL ENCOUNTER (OUTPATIENT)
Dept: HOSPITAL 58 - RAD | Age: 59
Discharge: HOME | End: 2019-08-23
Attending: NURSE PRACTITIONER

## 2019-08-23 VITALS — BODY MASS INDEX: 29 KG/M2

## 2019-08-23 DIAGNOSIS — M54.12: Primary | ICD-10-CM

## 2019-08-23 NOTE — DI
EXAM:  Cervical spine three views 

  

  

HISTORY:  Radiculopathy. 

  

FINDINGS:  Compared to 09/11/2018.  General bone density appears normal.  No scoliosis is seen.   Lat
eral masses of C1 and C2 are normally aligned and the odontoid process is intact.  Lateral projection
 reveals moderate degenerative disc and facet disease throughout the spine coming slightly more sever
e at C6/C7.  There is no loss of vertebral body height, spondylolisthesis or fracture.  Degenerative 
changes are slightly more noticeable since prior study. 

  

  

IMPRESSION: 

1.  Worsening degenerative changes of the spine.

## 2024-05-09 ENCOUNTER — APPOINTMENT (RX ONLY)
Dept: URBAN - METROPOLITAN AREA CLINIC 194 | Facility: CLINIC | Age: 64
Setting detail: DERMATOLOGY
End: 2024-05-09

## 2024-05-09 DIAGNOSIS — L57.8 OTHER SKIN CHANGES DUE TO CHRONIC EXPOSURE TO NONIONIZING RADIATION: ICD-10-CM

## 2024-05-09 DIAGNOSIS — L57.0 ACTINIC KERATOSIS: ICD-10-CM

## 2024-05-09 DIAGNOSIS — M31.0 HYPERSENSITIVITY ANGIITIS: ICD-10-CM

## 2024-05-09 PROBLEM — D48.5 NEOPLASM OF UNCERTAIN BEHAVIOR OF SKIN: Status: ACTIVE | Noted: 2024-05-09

## 2024-05-09 PROBLEM — L30.9 DERMATITIS, UNSPECIFIED: Status: ACTIVE | Noted: 2024-05-09

## 2024-05-09 PROCEDURE — 11104 PUNCH BX SKIN SINGLE LESION: CPT

## 2024-05-09 PROCEDURE — 17000 DESTRUCT PREMALG LESION: CPT | Mod: 59

## 2024-05-09 PROCEDURE — ? BIOPSY BY PUNCH METHOD

## 2024-05-09 PROCEDURE — ? BIOPSY BY PUNCH METHOD FOR DIF

## 2024-05-09 PROCEDURE — ? BIOPSY BY SHAVE METHOD

## 2024-05-09 PROCEDURE — ? LIQUID NITROGEN

## 2024-05-09 PROCEDURE — 99203 OFFICE O/P NEW LOW 30 MIN: CPT | Mod: 25

## 2024-05-09 PROCEDURE — ? PRESCRIPTION

## 2024-05-09 PROCEDURE — ? COUNSELING

## 2024-05-09 PROCEDURE — 11103 TANGNTL BX SKIN EA SEP/ADDL: CPT

## 2024-05-09 PROCEDURE — 17003 DESTRUCT PREMALG LES 2-14: CPT

## 2024-05-09 PROCEDURE — 11105 PUNCH BX SKIN EA SEP/ADDL: CPT

## 2024-05-09 RX ORDER — TRIAMCINOLONE ACETONIDE 1 MG/G
CREAM TOPICAL BID
Qty: 454 | Refills: 0 | Status: ERX | COMMUNITY
Start: 2024-05-09

## 2024-05-09 RX ADMIN — TRIAMCINOLONE ACETONIDE: 1 CREAM TOPICAL at 00:00

## 2024-05-09 ASSESSMENT — LOCATION SIMPLE DESCRIPTION DERM
LOCATION SIMPLE: LEFT FOREARM
LOCATION SIMPLE: RIGHT FOREARM
LOCATION SIMPLE: LEFT PRETIBIAL REGION

## 2024-05-09 ASSESSMENT — LOCATION DETAILED DESCRIPTION DERM
LOCATION DETAILED: RIGHT PROXIMAL RADIAL DORSAL FOREARM
LOCATION DETAILED: RIGHT PROXIMAL DORSAL FOREARM
LOCATION DETAILED: LEFT PROXIMAL PRETIBIAL REGION
LOCATION DETAILED: LEFT DISTAL DORSAL FOREARM

## 2024-05-09 ASSESSMENT — LOCATION ZONE DERM
LOCATION ZONE: ARM
LOCATION ZONE: LEG

## 2024-05-09 NOTE — PROCEDURE: BIOPSY BY PUNCH METHOD FOR DIF
Detail Level: Detailed
Was A Bandage Applied: Yes
Punch Size In Mm: 4
Size Of Lesion In Cm (Optional): 0
Depth Of Punch Biopsy: dermis
Biopsy Type: DIF (perilesional)
Anesthesia Type: 1% lidocaine with epinephrine
Anesthesia Volume In Cc: 0.5
Hemostasis: None
Epidermal Sutures: 4-0 Prolene
Wound Care: Petrolatum
Dressing: bandage
Suture Removal: 14 days
Patient Will Remove Sutures At Home?: No
Lab: [id number]
Lab Facility: [id number]
Consent: Written consent was obtained and risks were reviewed including but not limited to scarring, infection, bleeding, scabbing, incomplete removal, nerve damage and allergy to anesthesia.
Post-Care Instructions: I reviewed with the patient in detail post-care instructions. Patient is to keep the biopsy site dry overnight, and then apply bacitracin twice daily until healed. Patient may apply hydrogen peroxide soaks to remove any crusting.
Home Suture Removal Text: Patient was provided a home suture removal kit and will remove their sutures at home.  If they have any questions or difficulties they will call the office.
Notification Instructions: Patient will be notified of biopsy results. However, patient instructed to call the office if not contacted within 2 weeks.
Billing Type: Third-Party Bill

## 2024-05-09 NOTE — PROCEDURE: BIOPSY BY SHAVE METHOD
Detail Level: Detailed
Depth Of Biopsy: dermis
Was A Bandage Applied: Yes
Size Of Lesion In Cm: 1.7
X Size Of Lesion In Cm: 1
Biopsy Type: H and E
Biopsy Method: Dermablade
Anesthesia Type: 1% lidocaine with epinephrine
Anesthesia Volume In Cc: 0.5
Additional Anesthesia Volume In Cc (Will Not Render If 0): 0
Hemostasis: Drysol
Wound Care: Petrolatum
Dressing: bandage
Destruction After The Procedure: No
Type Of Destruction Used: Curettage
Curettage Text: The wound bed was treated with curettage after the biopsy was performed.
Cryotherapy Text: The wound bed was treated with cryotherapy after the biopsy was performed.
Electrodesiccation Text: The wound bed was treated with electrodesiccation after the biopsy was performed.
Electrodesiccation And Curettage Text: The wound bed was treated with electrodesiccation and curettage after the biopsy was performed.
Silver Nitrate Text: The wound bed was treated with silver nitrate after the biopsy was performed.
Lab: [id number]
Lab Facility: [id number]
Consent: Written consent was obtained and risks were reviewed including but not limited to scarring, infection, bleeding, scabbing, incomplete removal, nerve damage and allergy to anesthesia.
Post-Care Instructions: I reviewed with the patient in detail post-care instructions. Patient is to keep the biopsy site dry overnight, and then apply bacitracin twice daily until healed. Patient may apply hydrogen peroxide soaks to remove any crusting.
Notification Instructions: Patient will be notified of biopsy results. However, patient instructed to call the office if not contacted within 2 weeks.
Billing Type: Third-Party Bill
Information: Selecting Yes will display possible errors in your note based on the variables you have selected. This validation is only offered as a suggestion for you. PLEASE NOTE THAT THE VALIDATION TEXT WILL BE REMOVED WHEN YOU FINALIZE YOUR NOTE. IF YOU WANT TO FAX A PRELIMINARY NOTE YOU WILL NEED TO TOGGLE THIS TO 'NO' IF YOU DO NOT WANT IT IN YOUR FAXED NOTE.

## 2024-05-09 NOTE — PROCEDURE: BIOPSY BY PUNCH METHOD

## 2024-05-23 ENCOUNTER — APPOINTMENT (RX ONLY)
Dept: URBAN - METROPOLITAN AREA CLINIC 194 | Facility: CLINIC | Age: 64
Setting detail: DERMATOLOGY
End: 2024-05-23

## 2024-05-23 DIAGNOSIS — L81.7 PIGMENTED PURPURIC DERMATOSIS: ICD-10-CM

## 2024-05-23 DIAGNOSIS — Z48.02 ENCOUNTER FOR REMOVAL OF SUTURES: ICD-10-CM

## 2024-05-23 PROCEDURE — 99213 OFFICE O/P EST LOW 20 MIN: CPT

## 2024-05-23 PROCEDURE — ? SUTURE REMOVAL (NO GLOBAL PERIOD)

## 2024-05-23 PROCEDURE — ? COUNSELING

## 2024-05-23 PROCEDURE — ? TREATMENT REGIMEN

## 2024-05-23 ASSESSMENT — LOCATION DETAILED DESCRIPTION DERM
LOCATION DETAILED: LEFT DISTAL PRETIBIAL REGION
LOCATION DETAILED: RIGHT DISTAL CALF
LOCATION DETAILED: RIGHT DISTAL PRETIBIAL REGION
LOCATION DETAILED: LEFT PROXIMAL PRETIBIAL REGION

## 2024-05-23 ASSESSMENT — LOCATION ZONE DERM: LOCATION ZONE: LEG

## 2024-05-23 ASSESSMENT — LOCATION SIMPLE DESCRIPTION DERM
LOCATION SIMPLE: RIGHT CALF
LOCATION SIMPLE: RIGHT PRETIBIAL REGION
LOCATION SIMPLE: LEFT PRETIBIAL REGION

## 2024-05-23 ASSESSMENT — SEVERITY ASSESSMENT: SEVERITY: MILD

## 2024-05-23 NOTE — PROCEDURE: TREATMENT REGIMEN
Plan: Continue TAC 0.1% cream as prescribed.
Detail Level: Zone
Plan: Continue TAC 0.1% cream as prescribed.\\nCan start ascorbic acid + bioflavonoids daily as well.

## (undated) DEVICE — STERILE LATEX POWDER FREE SURGICAL GLOVES WITH HYDROGEL COATING: Brand: PROTEXIS

## (undated) DEVICE — Z DUP USE 2257490 ADHESIVE SKIN CLSRE 036ML TPCL 2CTL CNCRLTE HIGH VSCSTY DRMB

## (undated) DEVICE — ASTOUND STANDARD SURGICAL GOWN, XXL: Brand: CONVERTORS

## (undated) DEVICE — SOLUTION IV IRRIG POUR BRL 0.9% SODIUM CHL 2F7124

## (undated) DEVICE — SUTURE MCRYL SZ 4-0 L18IN ABSRB UD L19MM PS-2 3/8 CIR PRIM Y496G

## (undated) DEVICE — STANDARD HYPODERMIC NEEDLE,POLYPROPYLENE HUB: Brand: MONOJECT

## (undated) DEVICE — PACK,UNIVERSAL,NO GOWNS: Brand: MEDLINE

## (undated) DEVICE — AIRLIFE™ NASAL OXYGEN CANNULA CURVED, NONFLARED TIP WITH 14 FOOT (4.3 M) CRUSH-RESISTANT TUBING, OVER-THE-EAR STYLE: Brand: AIRLIFE™

## (undated) DEVICE — SUTURE VCRL SZ 3-0 L27IN ABSRB UD L26MM SH 1/2 CIR J416H

## (undated) DEVICE — MINOR CDS: Brand: MEDLINE INDUSTRIES, INC.